# Patient Record
Sex: FEMALE | Race: BLACK OR AFRICAN AMERICAN | NOT HISPANIC OR LATINO
[De-identification: names, ages, dates, MRNs, and addresses within clinical notes are randomized per-mention and may not be internally consistent; named-entity substitution may affect disease eponyms.]

---

## 2018-09-20 PROBLEM — Z00.00 ENCOUNTER FOR PREVENTIVE HEALTH EXAMINATION: Status: ACTIVE | Noted: 2018-09-20

## 2018-10-05 ENCOUNTER — RX RENEWAL (OUTPATIENT)
Age: 53
End: 2018-10-05

## 2018-10-08 ENCOUNTER — APPOINTMENT (OUTPATIENT)
Dept: OBGYN | Facility: CLINIC | Age: 53
End: 2018-10-08
Payer: COMMERCIAL

## 2018-10-08 VITALS
WEIGHT: 197 LBS | BODY MASS INDEX: 29.86 KG/M2 | DIASTOLIC BLOOD PRESSURE: 52 MMHG | HEIGHT: 68 IN | SYSTOLIC BLOOD PRESSURE: 104 MMHG

## 2018-10-08 DIAGNOSIS — Z86.018 PERSONAL HISTORY OF OTHER BENIGN NEOPLASM: ICD-10-CM

## 2018-10-08 DIAGNOSIS — Z80.3 FAMILY HISTORY OF MALIGNANT NEOPLASM OF BREAST: ICD-10-CM

## 2018-10-08 PROCEDURE — 99213 OFFICE O/P EST LOW 20 MIN: CPT

## 2019-05-06 ENCOUNTER — APPOINTMENT (OUTPATIENT)
Dept: OBGYN | Facility: CLINIC | Age: 54
End: 2019-05-06
Payer: COMMERCIAL

## 2019-05-06 VITALS
BODY MASS INDEX: 29.55 KG/M2 | SYSTOLIC BLOOD PRESSURE: 104 MMHG | DIASTOLIC BLOOD PRESSURE: 64 MMHG | WEIGHT: 195 LBS | HEIGHT: 68 IN

## 2019-05-06 PROCEDURE — 99213 OFFICE O/P EST LOW 20 MIN: CPT

## 2019-05-06 NOTE — COUNSELING
[Breast Self Exam] : breast self exam [Nutrition] : nutrition [Exercise] : exercise [Vitamins/Supplements] : vitamins/supplements [STD (testing, results, tx)] : STD (testing, results, tx) [Contraception] : contraception [Medication Management] : medication management

## 2019-12-10 ENCOUNTER — APPOINTMENT (OUTPATIENT)
Dept: OBGYN | Facility: CLINIC | Age: 54
End: 2019-12-10
Payer: COMMERCIAL

## 2019-12-10 ENCOUNTER — RECORD ABSTRACTING (OUTPATIENT)
Age: 54
End: 2019-12-10

## 2019-12-10 VITALS
SYSTOLIC BLOOD PRESSURE: 110 MMHG | DIASTOLIC BLOOD PRESSURE: 68 MMHG | HEIGHT: 68 IN | BODY MASS INDEX: 29.4 KG/M2 | WEIGHT: 194 LBS

## 2019-12-10 DIAGNOSIS — Z78.0 ASYMPTOMATIC MENOPAUSAL STATE: ICD-10-CM

## 2019-12-10 DIAGNOSIS — Z01.419 ENCOUNTER FOR GYNECOLOGICAL EXAMINATION (GENERAL) (ROUTINE) W/OUT ABNORMAL FINDINGS: ICD-10-CM

## 2019-12-10 DIAGNOSIS — Z78.9 OTHER SPECIFIED HEALTH STATUS: ICD-10-CM

## 2019-12-10 LAB
CYTOLOGY CVX/VAG DOC THIN PREP: NORMAL
CYTOLOGY CVX/VAG DOC THIN PREP: NORMAL

## 2019-12-10 PROCEDURE — 99396 PREV VISIT EST AGE 40-64: CPT

## 2019-12-12 NOTE — PHYSICAL EXAM
[Alert] : alert [Awake] : awake [Acute Distress] : no acute distress [Mass] : no breast mass [Nipple Discharge] : no nipple discharge [Axillary LAD] : no axillary lymphadenopathy [Soft] : soft [Tender] : non tender [Oriented x3] : oriented to person, place, and time [Normal] : uterus [No Bleeding] : there was no active vaginal bleeding [Uterine Adnexae] : were not tender and not enlarged

## 2019-12-12 NOTE — REVIEW OF SYSTEMS
[Vaginal Itching] : no vaginal itching [Vaginal Discharge] : vaginal discharge [Vaginal Odor] : no vaginal odor [Hot Flashes] : hot flashes [Nl] : Endocrine

## 2020-02-25 ENCOUNTER — APPOINTMENT (OUTPATIENT)
Dept: OBGYN | Facility: CLINIC | Age: 55
End: 2020-02-25
Payer: COMMERCIAL

## 2020-02-25 VITALS
WEIGHT: 191 LBS | SYSTOLIC BLOOD PRESSURE: 120 MMHG | HEIGHT: 68 IN | BODY MASS INDEX: 28.95 KG/M2 | DIASTOLIC BLOOD PRESSURE: 64 MMHG

## 2020-02-25 PROCEDURE — 99213 OFFICE O/P EST LOW 20 MIN: CPT

## 2020-03-23 ENCOUNTER — APPOINTMENT (OUTPATIENT)
Dept: OBGYN | Facility: CLINIC | Age: 55
End: 2020-03-23
Payer: COMMERCIAL

## 2020-03-23 VITALS
DIASTOLIC BLOOD PRESSURE: 60 MMHG | HEIGHT: 68 IN | SYSTOLIC BLOOD PRESSURE: 102 MMHG | WEIGHT: 191 LBS | BODY MASS INDEX: 28.95 KG/M2

## 2020-03-23 PROCEDURE — 99213 OFFICE O/P EST LOW 20 MIN: CPT

## 2020-08-24 ENCOUNTER — APPOINTMENT (OUTPATIENT)
Dept: OBGYN | Facility: CLINIC | Age: 55
End: 2020-08-24
Payer: COMMERCIAL

## 2020-08-24 VITALS
DIASTOLIC BLOOD PRESSURE: 76 MMHG | HEIGHT: 68 IN | WEIGHT: 183 LBS | BODY MASS INDEX: 27.74 KG/M2 | SYSTOLIC BLOOD PRESSURE: 112 MMHG

## 2020-08-24 PROCEDURE — 99213 OFFICE O/P EST LOW 20 MIN: CPT

## 2020-09-23 ENCOUNTER — APPOINTMENT (OUTPATIENT)
Dept: OBGYN | Facility: CLINIC | Age: 55
End: 2020-09-23
Payer: COMMERCIAL

## 2020-09-23 VITALS
DIASTOLIC BLOOD PRESSURE: 72 MMHG | HEIGHT: 68 IN | WEIGHT: 185 LBS | BODY MASS INDEX: 28.04 KG/M2 | SYSTOLIC BLOOD PRESSURE: 104 MMHG

## 2020-09-23 PROCEDURE — 99213 OFFICE O/P EST LOW 20 MIN: CPT

## 2020-09-23 NOTE — HISTORY OF PRESENT ILLNESS
[FreeTextEntry1] : 53yo  LMP 2018 presenting for definitive management of fibroid uterus.  Pt describes symtoms of pelvic pressure/pain.

## 2020-09-23 NOTE — PHYSICAL EXAM
[Appropriately responsive] : appropriately responsive [Alert] : alert [No Acute Distress] : no acute distress [No Lymphadenopathy] : no lymphadenopathy [Soft] : soft [Non-tender] : non-tender [Non-distended] : non-distended [No HSM] : No HSM [No Lesions] : no lesions [No Mass] : no mass [Oriented x3] : oriented x3 [Labia Majora] : normal [Labia Minora] : normal [Normal] : normal [Enlarged ___ wks] : enlarged [unfilled] ~Uweeks [Uterine Adnexae] : normal

## 2020-09-23 NOTE — COUNSELING
[Nutrition/ Exercise/ Weight Management] : nutrition, exercise, weight management [Vitamins/Supplements] : vitamins/supplements [Pre/Post Op Instructions] : pre/post op instructions

## 2020-09-23 NOTE — DISCUSSION/SUMMARY
[FreeTextEntry1] : I discussed the imaging findings and her symptoms which warrant surgical intervention.  I explained that her symptoms are likely related to fibroids or other intrauterine pathology.  We discussed management options for this which include medical vs surgical management.  As far as medical management, we discussed hormonal vs non-hormonal options including the oral contraceptive pill, LNG IUD, Nuva Ring and TXA.  For surgical management we discussed Hysteroscopy with possible endometrial ablation.  We discussed myomectomy versus hysterectomy.  We also discussed type of hysterectomy, total versus supracervical.  We discussed that there is no proven medical benefit to keeping her cervix.  The patient desires definitive management.\par \par I recommend total hysterectomy with bilateral salpingectomy via an abdominal approach.  The patient agrees to CONSUELO, b/l salpingectomy. We discussed retaining her ovaries to avoid surgical menopause. We discussed the indications, risks, benefits and alternatives.  We discussed bleeding, infection, and injury to nearby organs at length.  All questions answered to her apparent satisfaction and pre/post operative instructions and expectations discussed.  \par \par Will book for CONSUELO, b/l salpingectomy.\par

## 2020-10-16 ENCOUNTER — OUTPATIENT (OUTPATIENT)
Dept: OUTPATIENT SERVICES | Facility: HOSPITAL | Age: 55
LOS: 1 days | End: 2020-10-16
Payer: COMMERCIAL

## 2020-10-16 ENCOUNTER — EMERGENCY (EMERGENCY)
Facility: HOSPITAL | Age: 55
LOS: 1 days | Discharge: HOME | End: 2020-10-16
Attending: EMERGENCY MEDICINE | Admitting: EMERGENCY MEDICINE
Payer: COMMERCIAL

## 2020-10-16 VITALS
TEMPERATURE: 97 F | SYSTOLIC BLOOD PRESSURE: 110 MMHG | RESPIRATION RATE: 18 BRPM | DIASTOLIC BLOOD PRESSURE: 75 MMHG | HEART RATE: 68 BPM | OXYGEN SATURATION: 96 % | HEIGHT: 68 IN

## 2020-10-16 VITALS
HEART RATE: 70 BPM | SYSTOLIC BLOOD PRESSURE: 115 MMHG | TEMPERATURE: 98 F | RESPIRATION RATE: 15 BRPM | DIASTOLIC BLOOD PRESSURE: 69 MMHG | OXYGEN SATURATION: 100 %

## 2020-10-16 VITALS
TEMPERATURE: 97 F | WEIGHT: 182.98 LBS | RESPIRATION RATE: 16 BRPM | HEART RATE: 112 BPM | SYSTOLIC BLOOD PRESSURE: 139 MMHG | OXYGEN SATURATION: 98 % | HEIGHT: 68 IN | DIASTOLIC BLOOD PRESSURE: 76 MMHG

## 2020-10-16 DIAGNOSIS — Z98.890 OTHER SPECIFIED POSTPROCEDURAL STATES: Chronic | ICD-10-CM

## 2020-10-16 DIAGNOSIS — D25.9 LEIOMYOMA OF UTERUS, UNSPECIFIED: ICD-10-CM

## 2020-10-16 DIAGNOSIS — R94.31 ABNORMAL ELECTROCARDIOGRAM [ECG] [EKG]: ICD-10-CM

## 2020-10-16 LAB
ANION GAP SERPL CALC-SCNC: 14 MMO/L — SIGNIFICANT CHANGE UP (ref 7–14)
BLD GP AB SCN SERPL QL: NEGATIVE — SIGNIFICANT CHANGE UP
BUN SERPL-MCNC: 14 MG/DL — SIGNIFICANT CHANGE UP (ref 7–23)
CALCIUM SERPL-MCNC: 9.7 MG/DL — SIGNIFICANT CHANGE UP (ref 8.4–10.5)
CHLORIDE SERPL-SCNC: 102 MMOL/L — SIGNIFICANT CHANGE UP (ref 98–107)
CO2 SERPL-SCNC: 22 MMOL/L — SIGNIFICANT CHANGE UP (ref 22–31)
CREAT SERPL-MCNC: 0.99 MG/DL — SIGNIFICANT CHANGE UP (ref 0.5–1.3)
GLUCOSE SERPL-MCNC: 76 MG/DL — SIGNIFICANT CHANGE UP (ref 70–99)
HCT VFR BLD CALC: 41.3 % — SIGNIFICANT CHANGE UP (ref 34.5–45)
HGB BLD-MCNC: 13.6 G/DL — SIGNIFICANT CHANGE UP (ref 11.5–15.5)
MCHC RBC-ENTMCNC: 28.3 PG — SIGNIFICANT CHANGE UP (ref 27–34)
MCHC RBC-ENTMCNC: 32.9 % — SIGNIFICANT CHANGE UP (ref 32–36)
MCV RBC AUTO: 86 FL — SIGNIFICANT CHANGE UP (ref 80–100)
NRBC # FLD: 0 K/UL — SIGNIFICANT CHANGE UP (ref 0–0)
PLATELET # BLD AUTO: 329 K/UL — SIGNIFICANT CHANGE UP (ref 150–400)
PMV BLD: 8.7 FL — SIGNIFICANT CHANGE UP (ref 7–13)
POTASSIUM SERPL-MCNC: 4.5 MMOL/L — SIGNIFICANT CHANGE UP (ref 3.5–5.3)
POTASSIUM SERPL-SCNC: 4.5 MMOL/L — SIGNIFICANT CHANGE UP (ref 3.5–5.3)
RBC # BLD: 4.8 M/UL — SIGNIFICANT CHANGE UP (ref 3.8–5.2)
RBC # FLD: 14.1 % — SIGNIFICANT CHANGE UP (ref 10.3–14.5)
RH IG SCN BLD-IMP: POSITIVE — SIGNIFICANT CHANGE UP
SODIUM SERPL-SCNC: 138 MMOL/L — SIGNIFICANT CHANGE UP (ref 135–145)
WBC # BLD: 7.33 K/UL — SIGNIFICANT CHANGE UP (ref 3.8–10.5)
WBC # FLD AUTO: 7.33 K/UL — SIGNIFICANT CHANGE UP (ref 3.8–10.5)

## 2020-10-16 PROCEDURE — 93010 ELECTROCARDIOGRAM REPORT: CPT

## 2020-10-16 PROCEDURE — 99283 EMERGENCY DEPT VISIT LOW MDM: CPT

## 2020-10-16 RX ORDER — SODIUM CHLORIDE 9 MG/ML
1000 INJECTION, SOLUTION INTRAVENOUS
Refills: 0 | Status: DISCONTINUED | OUTPATIENT
Start: 2020-10-23 | End: 2020-10-24

## 2020-10-16 NOTE — ED PROVIDER NOTE - NS ED ROS FT
Gen: Denies fever  HEENT: Denies headache and congestion.  CV: Denies chest pain, lightheadedness, and LE swelling.  Skin: Denies rash.   Resp: Denies SOB and cough.  GI: Denies abd pain, diarrhea, nausea, and vomiting. Denies melena and hematochezia.  Msk: Denies recent trauma and extremity pain.  : Denies dysuria and hematuria.  Neuro: Denies LOC, dizziness, and new numbness/tingling or new focal weakness  Psych: Denies SI

## 2020-10-16 NOTE — ED ADULT TRIAGE NOTE - CHIEF COMPLAINT QUOTE
Pt comes to ED from ambulatory surgery showing PAT on EKG, HR to 120. Pt denied feeling dyspnea, chest pain, palpitations, dizziness, diaphoresis and all other symptoms. Pt is asymptomatic at this time. Denies PMH, currently takes birth control

## 2020-10-16 NOTE — ED PROVIDER NOTE - PATIENT PORTAL LINK FT
You can access the FollowMyHealth Patient Portal offered by [facility] by registering at the following website: [email or web address]. By joining [facility]’s FollowMyHealth portal, you will also be able to view your health information using other applications (apps) compatible with our system.

## 2020-10-16 NOTE — ED PROVIDER NOTE - PHYSICAL EXAMINATION
Gen: A&Ox4. Non-toxic appearing  HEENT: Atraumatic. No pharyngeal erythema of exudates. Mucous membranes moist, no scleral icterus.   CV: Regular rate and rhythm. No M/R/G. No significant lower extremity edema. Radial and DP pulses present 2+ bilaterally. Capillary refill less than 2 seconds.  Resp: Normal effort. CTAB, no rales, rhonchi, or wheezes.  GI: Abdomen non tender to palpation, soft and non-distended. No masses appreciated. + bowel sounds.  MSK: No open wounds or no ecchymosis  Neuro: Speaking in full sentences, following commands, moving extremities spontaneously  Psych: Appropriate mood, cooperative

## 2020-10-16 NOTE — H&P PST ADULT - RS GEN PE MLT RESP DETAILS PC
good air movement/clear to auscultation bilaterally/no rhonchi/no wheezes/breath sounds equal/no rales/airway patent

## 2020-10-16 NOTE — ED PROVIDER NOTE - NSPTACCESSSVCSAPPTDETAILS_ED_ALL_ED_FT
URGENT: Needs follow up with cardiology urgently for evaluation of arrhythmia caught on outpatient ecg.

## 2020-10-16 NOTE — ED ADULT NURSE NOTE - OBJECTIVE STATEMENT
pt brought into room 21 A&OX4 ambulatory self care 57 yr old female presents to ed today for abnormal EKG from ambulatory surgery. EKG showed PAT. PT denies any complaints, denies CP/SOB/ palpitations. pt in NAD. Awaiting further orders. Report given to primary RN Leonardo

## 2020-10-16 NOTE — ED PROVIDER NOTE - CLINICAL SUMMARY MEDICAL DECISION MAKING FREE TEXT BOX
54 yo F w/ hx of fibroids, no reported pertinent PMH, presenting from ambulatory surgery - pre-op visit for hysterectomy for episode of SVT/PAT caught on ECG today. No symptoms associated. No hx of cardiac disease. Narrow complex arrhythmia caught on ecg, terminated spontaneously on same ecg. VSS, exam as above. Concern for arrhythmia. Labs, will reassess.

## 2020-10-16 NOTE — ED PROVIDER NOTE - OBJECTIVE STATEMENT
54 yo F w/ no reported pertinent PMH, presenting from ambulatory surgery for episode of SVT/PAT caught on ECG today.   She denies having any symptoms with this. Denies dyspnea, palpitations, dizziness, diaphoresis, CP. On BC, no other medications. 56 yo F w/ hx of fibroids, no reported pertinent PMH, presenting from ambulatory surgery - pre-op visit for hysterectomy for episode of SVT/PAT caught on ECG today. She denies having any symptoms with this. Denies dyspnea, palpitations, dizziness, diaphoresis, CP. On BC, no other medications. Currently taking levonorgestrel, no other medications. Denies allergies to medications. No hx of similar episodes according to pt. No reported hx of cardiac disease. 56 yo F w/ hx of fibroids, no reported pertinent PMH, presenting from ambulatory surgery - pre-op visit for hysterectomy for episode of SVT/PAT caught on ECG today. She denies having any symptoms with this. Denies dyspnea, palpitations, dizziness, diaphoresis, CP. On BC, no other medications. Currently taking levonorgestrel, no other medications. Denies allergies to medications. No hx of similar episodes according to pt. No reported hx of cardiac disease.    Attendinyo female presents because EKG earlier showed tachydysrhythmia.  pt feels fine now.  no complaints.

## 2020-10-16 NOTE — H&P PST ADULT - NEGATIVE GENERAL GENITOURINARY SYMPTOMS
no flank pain L/no bladder infections/no dysuria/no hematuria/normal urinary frequency/no flank pain R

## 2020-10-16 NOTE — H&P PST ADULT - NSICDXPROBLEM_GEN_ALL_CORE_FT
PROBLEM DIAGNOSES  Problem: Leiomyoma of uterus  Assessment and Plan: Patient tentatively scheduled for total abdominal hysterectomy , bilateral salpingectomy on 10/23/20  Pre-op instructions provided. Pt given verbal and written instructions with teach back on chlorhexidine shampoo and pepcid. Pt verbalized understanding with return demonstration.   Covid test scheduled on 10/20/20    Problem: Abnormal EKG  Assessment and Plan: Patient heart rate on exam around 115- 120. EKG done at [facility] revealed abnormal EKG. Case discussed with Dr Esquivel. Discussed with jesika the need to be transfer to ED for further evaluation. EMS called and patient transfered to [facility] ED. Will notify surgeon via phone call        PROBLEM DIAGNOSES  Problem: Leiomyoma of uterus  Assessment and Plan: Patient tentatively scheduled for total abdominal hysterectomy , bilateral salpingectomy on 10/23/20  Pre-op instructions provided. Pt given verbal and written instructions with teach back on chlorhexidine shampoo and pepcid. Pt verbalized understanding with return demonstration.   Covid test scheduled on 10/20/20    Problem: Abnormal EKG  Assessment and Plan: Patient heart rate on exam around 115- 120. EKG done at [facility] revealed abnormal EKG. Patient asymptomatic Case discussed with Dr Esquivel. Discussed with jesika the need to be transfer to ED for further evaluation. EMS called and patient transfered to [facility] ED at 1915 hrs . Surgeon to be notifed via phone call as email unavailable.

## 2020-10-16 NOTE — H&P PST ADULT - NEGATIVE OPHTHALMOLOGIC SYMPTOMS
no discharge R/no blurred vision R/no lacrimation L/no lacrimation R/no diplopia/no discharge L/no blurred vision L

## 2020-10-16 NOTE — ED PROVIDER NOTE - NSFOLLOWUPCLINICS_GEN_ALL_ED_FT
[facility]  Cardiology  [address] [place] [id number]  Phone: [phone]  Fax:   Follow Up Time:

## 2020-10-16 NOTE — H&P PST ADULT - NSANTHOSAYNRD_GEN_A_CORE
No. OLLIE screening performed.  STOP BANG Legend: 0-2 = LOW Risk; 3-4 = INTERMEDIATE Risk; 5-8 = HIGH Risk

## 2020-10-16 NOTE — H&P PST ADULT - HISTORY OF PRESENT ILLNESS
leiomyoma of uterus scheduled for total abdominal hysterectomy , bilateral salpingectomy. 55 year old female presents to Presurgical testing with diagnosis of leiomyoma of uterus scheduled for total abdominal hysterectomy , bilateral salpingectomy. Patient with c/o pelvic pressure and pain  since one year. RALPH revealed uterine fibroids.

## 2020-10-16 NOTE — H&P PST ADULT - NSICDXPASTSURGICALHX_GEN_ALL_CORE_FT
PAST SURGICAL HISTORY:  S/P removal of ovarian cyst      PAST SURGICAL HISTORY:  S/P bunionectomy b/l  foot 2007/2009    S/P removal of ovarian cyst 2008

## 2020-10-16 NOTE — ED PROVIDER NOTE - NSFOLLOWUPINSTRUCTIONS_ED_ALL_ED_FT
You presented to the emergency department for an abnormal heart rhythm. Your evaluation in the emergency department included an physician evaluation and testing consisting of: Lab work and imaging. These tests did not reveal any findings concerning for immediate threats to your health. Any results are available to you in your discharge paperwork. It is recommended that you follow-up with your physician or the recommended physician/clinic (cardiology) within 1-3 days as discussed for a repeat evaluation, and potentially further testing and treatment.     Please continue taking your regular medications as prescribed.     PLEASE RETURN TO THE EMERGENCY DEPARTMENT IMMEDIATELY IF you have any fevers, uncontrollable nausea and vomiting, LIGHTHEADEDNESS, inability to tolerate eating and drinking, severe increase in symptoms or pain, or an other new symptoms that concern you.

## 2020-10-16 NOTE — H&P PST ADULT - NEGATIVE ENMT SYMPTOMS
no nasal congestion/no hearing difficulty/no vertigo/no sinus symptoms/no ear pain/no nasal discharge

## 2020-10-19 ENCOUNTER — TRANSCRIPTION ENCOUNTER (OUTPATIENT)
Age: 55
End: 2020-10-19

## 2020-10-19 PROBLEM — D25.9 LEIOMYOMA OF UTERUS, UNSPECIFIED: Chronic | Status: ACTIVE | Noted: 2020-10-16

## 2020-10-20 ENCOUNTER — APPOINTMENT (OUTPATIENT)
Dept: DISASTER EMERGENCY | Facility: CLINIC | Age: 55
End: 2020-10-20

## 2020-10-20 ENCOUNTER — APPOINTMENT (OUTPATIENT)
Dept: CARDIOLOGY | Facility: CLINIC | Age: 55
End: 2020-10-20
Payer: COMMERCIAL

## 2020-10-20 VITALS
WEIGHT: 185 LBS | OXYGEN SATURATION: 98 % | HEART RATE: 71 BPM | DIASTOLIC BLOOD PRESSURE: 72 MMHG | BODY MASS INDEX: 28.04 KG/M2 | TEMPERATURE: 99.5 F | HEIGHT: 68 IN | RESPIRATION RATE: 17 BRPM | SYSTOLIC BLOOD PRESSURE: 113 MMHG

## 2020-10-20 PROCEDURE — 93306 TTE W/DOPPLER COMPLETE: CPT

## 2020-10-20 PROCEDURE — 99203 OFFICE O/P NEW LOW 30 MIN: CPT

## 2020-10-20 PROCEDURE — 99072 ADDL SUPL MATRL&STAF TM PHE: CPT

## 2020-10-21 ENCOUNTER — NON-APPOINTMENT (OUTPATIENT)
Age: 55
End: 2020-10-21

## 2020-10-21 LAB — SARS-COV-2 N GENE NPH QL NAA+PROBE: NOT DETECTED

## 2020-10-21 NOTE — PHYSICAL EXAM
[General Appearance - In No Acute Distress] : no acute distress [Normal Appearance] : normal appearance [Eyelids - No Xanthelasma] : the eyelids demonstrated no xanthelasmas [Normal Jugular Venous A Waves Present] : normal jugular venous A waves present [Normal Jugular Venous V Waves Present] : normal jugular venous V waves present [No Jugular Venous Blanchard A Waves] : no jugular venous blanchard A waves [Heart Rate And Rhythm] : heart rate and rhythm were normal [Murmurs] : no murmurs present [Heart Sounds] : normal S1 and S2 [Arterial Pulses Normal] : the arterial pulses were normal [Edema] : no peripheral edema present [Respiration, Rhythm And Depth] : normal respiratory rhythm and effort [Auscultation Breath Sounds / Voice Sounds] : lungs were clear to auscultation bilaterally [Abdomen Soft] : soft [Abdomen Tenderness] : non-tender [Abnormal Walk] : normal gait [Nail Clubbing] : no clubbing of the fingernails [Cyanosis, Localized] : no localized cyanosis [Skin Color & Pigmentation] : normal skin color and pigmentation [Skin Turgor] : normal skin turgor [] : no rash [Oriented To Time, Place, And Person] : oriented to person, place, and time [Impaired Insight] : insight and judgment were intact [FreeTextEntry1] : LAUREL monterol

## 2020-10-21 NOTE — REVIEW OF SYSTEMS
[Abdominal Pain] : abdominal pain [see HPI] : see HPI [Negative] : ENT [Fever] : no fever [Chills] : no chills [Blurry Vision] : no blurred vision [Eyeglasses] : not currently wearing eyeglasses [Dysphagia] : no dysphagia [Dysuria] : no dysuria [Incontinence] : no incontinence [Joint Pain] : no joint pain [Muscle Cramps] : no muscle cramps [Skin: A Rash] : no rash: [Dizziness] : no dizziness [Convulsions] : no convulsions [Confusion] : no confusion was observed [Anxiety] : no anxiety [Excessive Thirst] : no polydipsia [Easy Bleeding] : no tendency for easy bleeding [Easy Bruising] : no tendency for easy bruising

## 2020-10-21 NOTE — DISCUSSION/SUMMARY
[FreeTextEntry1] : Abnormal ECG/pAT. Asymptomatic but noted on two outpt ECG's.\par -TTE today without evidence of structural heart disease\par -24 hour holter monitor with 4% SVE burden, runs of SVT which upon further review may reflect PAT vs. PJT. No further treatment unless the pt is to become symptomatic.\par -we reviewed limiting caffeine intake to her one cup of coffee daily and to avoid the use of pre-workout supplements as they usually contain caffeine as well as other sympathomimetics that can precipitate cardiac tachyarrhythmias.\par \par There is no cardiac contraindication to proceeding with the planned procedure. paroxysms of SVT may be expected and may be conservatively managed unless there is hemodynamic compromise or symptoms.\par

## 2020-10-21 NOTE — HISTORY OF PRESENT ILLNESS
[FreeTextEntry1] : 55F with fibroid uterus who presents for paroxysmal SVT noted during routine preop testing.\par \par CONSUELO/ b/l salpingectomy planned for 10/23/2020\par \par ECG from 10/16/2020 reviewed\par SR, possible LAE\par poor scan resolution, however appears to be pAT\par \par pt reports no symptoms. no CP, SOB, URIBE, orthopnea, LE edema, dizziness, or LOC\par  \par maternal gfather MI age 57\par \par Previously took a supplement named "Uncut." contains an unlisted amount of caffeine, some vitamins, and what appears to be sympathomimetic herbs. \par 1 cup of coffee daily\par no tobacco, rare etoh, no other supplements.

## 2020-10-22 ENCOUNTER — TRANSCRIPTION ENCOUNTER (OUTPATIENT)
Age: 55
End: 2020-10-22

## 2020-10-23 ENCOUNTER — TRANSCRIPTION ENCOUNTER (OUTPATIENT)
Age: 55
End: 2020-10-23

## 2020-10-23 ENCOUNTER — RESULT REVIEW (OUTPATIENT)
Age: 55
End: 2020-10-23

## 2020-10-23 ENCOUNTER — APPOINTMENT (OUTPATIENT)
Dept: OBGYN | Facility: CLINIC | Age: 55
End: 2020-10-23

## 2020-10-23 ENCOUNTER — INPATIENT (INPATIENT)
Facility: HOSPITAL | Age: 55
LOS: 2 days | Discharge: HOME | End: 2020-10-26
Attending: OBSTETRICS & GYNECOLOGY | Admitting: OBSTETRICS & GYNECOLOGY
Payer: COMMERCIAL

## 2020-10-23 VITALS
RESPIRATION RATE: 16 BRPM | DIASTOLIC BLOOD PRESSURE: 68 MMHG | SYSTOLIC BLOOD PRESSURE: 112 MMHG | TEMPERATURE: 99 F | HEIGHT: 68 IN | HEART RATE: 75 BPM | WEIGHT: 182.98 LBS | OXYGEN SATURATION: 100 %

## 2020-10-23 DIAGNOSIS — D25.9 LEIOMYOMA OF UTERUS, UNSPECIFIED: ICD-10-CM

## 2020-10-23 DIAGNOSIS — Z98.890 OTHER SPECIFIED POSTPROCEDURAL STATES: Chronic | ICD-10-CM

## 2020-10-23 LAB
CK MB BLD-MCNC: 1.35 NG/ML — SIGNIFICANT CHANGE UP (ref 1–4.7)
CK SERPL-CCNC: 145 U/L — SIGNIFICANT CHANGE UP (ref 25–170)
RH IG SCN BLD-IMP: POSITIVE — SIGNIFICANT CHANGE UP
TROPONIN T, HIGH SENSITIVITY: < 6 NG/L — SIGNIFICANT CHANGE UP (ref ?–14)

## 2020-10-23 PROCEDURE — 88305 TISSUE EXAM BY PATHOLOGIST: CPT | Mod: 26

## 2020-10-23 PROCEDURE — 58150 TOTAL HYSTERECTOMY: CPT

## 2020-10-23 PROCEDURE — 88307 TISSUE EXAM BY PATHOLOGIST: CPT | Mod: 26

## 2020-10-23 PROCEDURE — 93010 ELECTROCARDIOGRAM REPORT: CPT

## 2020-10-23 RX ORDER — OXYCODONE AND ACETAMINOPHEN 5; 325 MG/1; MG/1
1 TABLET ORAL EVERY 4 HOURS
Refills: 0 | Status: DISCONTINUED | OUTPATIENT
Start: 2020-10-23 | End: 2020-10-23

## 2020-10-23 RX ORDER — ACETAMINOPHEN 500 MG
975 TABLET ORAL EVERY 6 HOURS
Refills: 0 | Status: DISCONTINUED | OUTPATIENT
Start: 2020-10-23 | End: 2020-10-26

## 2020-10-23 RX ORDER — ONDANSETRON 8 MG/1
4 TABLET, FILM COATED ORAL EVERY 4 HOURS
Refills: 0 | Status: DISCONTINUED | OUTPATIENT
Start: 2020-10-23 | End: 2020-10-26

## 2020-10-23 RX ORDER — METOPROLOL TARTRATE 50 MG
5 TABLET ORAL ONCE
Refills: 0 | Status: DISCONTINUED | OUTPATIENT
Start: 2020-10-23 | End: 2020-10-23

## 2020-10-23 RX ORDER — OXYCODONE HYDROCHLORIDE 5 MG/1
5 TABLET ORAL EVERY 4 HOURS
Refills: 0 | Status: DISCONTINUED | OUTPATIENT
Start: 2020-10-23 | End: 2020-10-26

## 2020-10-23 RX ORDER — SODIUM CHLORIDE 9 MG/ML
500 INJECTION, SOLUTION INTRAVENOUS ONCE
Refills: 0 | Status: COMPLETED | OUTPATIENT
Start: 2020-10-23 | End: 2020-10-23

## 2020-10-23 RX ORDER — METOPROLOL TARTRATE 50 MG
25 TABLET ORAL ONCE
Refills: 0 | Status: COMPLETED | OUTPATIENT
Start: 2020-10-23 | End: 2020-10-23

## 2020-10-23 RX ORDER — METOPROLOL TARTRATE 50 MG
5 TABLET ORAL ONCE
Refills: 0 | Status: COMPLETED | OUTPATIENT
Start: 2020-10-23 | End: 2020-10-23

## 2020-10-23 RX ORDER — KETOROLAC TROMETHAMINE 30 MG/ML
15 SYRINGE (ML) INJECTION EVERY 6 HOURS
Refills: 0 | Status: DISCONTINUED | OUTPATIENT
Start: 2020-10-23 | End: 2020-10-24

## 2020-10-23 RX ORDER — OXYCODONE AND ACETAMINOPHEN 5; 325 MG/1; MG/1
2 TABLET ORAL EVERY 6 HOURS
Refills: 0 | Status: DISCONTINUED | OUTPATIENT
Start: 2020-10-23 | End: 2020-10-23

## 2020-10-23 RX ORDER — HYDROMORPHONE HYDROCHLORIDE 2 MG/ML
0.25 INJECTION INTRAMUSCULAR; INTRAVENOUS; SUBCUTANEOUS ONCE
Refills: 0 | Status: DISCONTINUED | OUTPATIENT
Start: 2020-10-23 | End: 2020-10-23

## 2020-10-23 RX ORDER — SODIUM CHLORIDE 9 MG/ML
1000 INJECTION, SOLUTION INTRAVENOUS
Refills: 0 | Status: DISCONTINUED | OUTPATIENT
Start: 2020-10-23 | End: 2020-10-24

## 2020-10-23 RX ORDER — INFLUENZA VIRUS VACCINE 15; 15; 15; 15 UG/.5ML; UG/.5ML; UG/.5ML; UG/.5ML
0.5 SUSPENSION INTRAMUSCULAR ONCE
Refills: 0 | Status: DISCONTINUED | OUTPATIENT
Start: 2020-10-23 | End: 2020-10-26

## 2020-10-23 RX ADMIN — HYDROMORPHONE HYDROCHLORIDE 0.25 MILLIGRAM(S): 2 INJECTION INTRAMUSCULAR; INTRAVENOUS; SUBCUTANEOUS at 19:00

## 2020-10-23 RX ADMIN — Medication 15 MILLIGRAM(S): at 23:01

## 2020-10-23 RX ADMIN — Medication 25 MILLIGRAM(S): at 18:45

## 2020-10-23 RX ADMIN — Medication 5 MILLIGRAM(S): at 19:45

## 2020-10-23 RX ADMIN — SODIUM CHLORIDE 500 MILLILITER(S): 9 INJECTION, SOLUTION INTRAVENOUS at 17:15

## 2020-10-23 RX ADMIN — HYDROMORPHONE HYDROCHLORIDE 0.25 MILLIGRAM(S): 2 INJECTION INTRAMUSCULAR; INTRAVENOUS; SUBCUTANEOUS at 19:15

## 2020-10-23 RX ADMIN — Medication 975 MILLIGRAM(S): at 23:01

## 2020-10-23 NOTE — BRIEF OPERATIVE NOTE - OPERATION/FINDINGS
Multifibroid uterus, bilateral ovaries significantly attenuated. MARCUS from bowel to left adnexa, taken down without complication. Sixto and fibrillar placed for hemostasis.

## 2020-10-23 NOTE — BRIEF OPERATIVE NOTE - NSICDXBRIEFPROCEDURE_GEN_ALL_CORE_FT
PROCEDURES:  Lysis of adhesions, pelvic 23-Oct-2020 19:58:36  Paulie Pascual  Total abdominal hysterectomy with salpingectomy 23-Oct-2020 19:56:10  Paulie Pascual

## 2020-10-23 NOTE — ASU PREOP CHECKLIST - ADVANCE DIRECTIVE ADDRESSED/READDRESSED
2019 [facility] Clinical Data Registry (for Quality Improvement)     Postoperative nausea/vomiting risk protocol (Adult = 18 yrs and Pediatric 3-17 yrs)- (430 and 463)  General inhalation anesthetic (NOT TIVA) with PONV risk factors: No  Provision of anti-emetic therapy with at least 2 different classes of agents: N/A  Patient DID NOT receive anti-emetic therapy and reason is documented in Medical Record: N/A    Multimodal Pain Management- (477)  Non-emergent surgery AND patient age >= 18: Yes  Use of Multimodal Pain Management, two or more drugs and/or interventions, NOT including systemic opioids: Yes  Exception: Documented allergy to multiple classes of analgesics: N/A    Smoking Abstinence (404)  Patient is current smoker (cigarette, pipe, e-cig, marijuanna): No  Elective Surgery:   Abstinence instructions provided prior to day of surgery:   Patient abstained from smoking on day of surgery:     Pre-Op Beta-Blocker in Isolated CABG (44)  Isolated CABG AND patient age >= 18: No  Beta-blocker admin within 24 hours of surgical incision:   Exception:of medical reason(s) for not administering beta blocker within 24 hours prior to surgical incision (e.g., not  indicated,other medical reason):     PACU assessment of acute postoperative pain prior to Anesthesia Care End- Applies to Patients Age = 18- (ABG7)  Initial PACU pain score is which of the following: < 7/10  Patient unable to report pain score: N/A    Post-anesthetic transfer of care checklist/protocol to PACU/ICU- (426 and 427)  Upon conclusion of case, patient transferred to which of the following locations: PACU/Non-ICU  Use of transfer checklist/protocol: Yes  Exclusion: Service Performed in Patient Hospital Room (and thus did not require transfer): N/A  Unplanned admission to ICU related to anesthesia service up through end of PACU care- (MD51)  Unplanned admission to ICU (not initially anticipated at anesthesia start time): No         
done

## 2020-10-23 NOTE — CHART NOTE - NSCHARTNOTEFT_GEN_A_CORE
PACU course notable for persistent tachycardia in 120's. Patient denies CP, SOB. N/V  EKG notable for new T wave inversions.  GYN team made made aware, Cardiology consulted.    O: Vital Signs Last 24 Hrs  T(C): 36.6 (23 Oct 2020 17:00), Max: 37.2 (23 Oct 2020 12:23)  T(F): 97.9 (23 Oct 2020 17:00), Max: 98.9 (23 Oct 2020 12:23)  HR: 119 (23 Oct 2020 18:30) (75 - 121)  BP: 135/75 (23 Oct 2020 18:15) (112/68 - 135/75)  BP(mean): 92 (23 Oct 2020 18:15) (79 - 92)  RR: 12 (23 Oct 2020 18:30) (8 - 16)  SpO2: 97% (23 Oct 2020 18:00) (90% - 100%)    A/P:  GYN team made aware  Cardiology   Contacted  Cardiac enzyme pending PACU course notable for persistent tachycardia in 120's. Patient denies CP, SOB. N/V  EKG notable for new T wave inversions.    O:   PHYSICAL EXAM:  GENERAL: NAD, lying in bed comfortably  CHEST/LUNG: Clear to auscultation bilaterally; Unlabored respirations  HEART: S1,S2, tachycardia  ABDOMEN: appropriately tender    Vital Signs Last 24 Hrs  T(C): 36.6 (23 Oct 2020 17:00), Max: 37.2 (23 Oct 2020 12:23)  T(F): 97.9 (23 Oct 2020 17:00), Max: 98.9 (23 Oct 2020 12:23)  HR: 119 (23 Oct 2020 18:30) (75 - 121)  BP: 135/75 (23 Oct 2020 18:15) (112/68 - 135/75)  BP(mean): 92 (23 Oct 2020 18:15) (79 - 92)  RR: 12 (23 Oct 2020 18:30) (8 - 16)  SpO2: 97% (23 Oct 2020 18:00) (90% - 100%)    A/P:    #Tachycardia, TWI   -GYN team made aware  -Cardiology consulted. Primary team will f/u Cardiology recommendations  -Metoprolol 25mg PO  -Cardiac enzymes pending PACU course notable for persistent tachycardia in 120's. Patient denies CP, SOB. N/V  EKG notable for new T wave inversions.    O:   PHYSICAL EXAM:  GENERAL: NAD, lying in bed comfortably  CHEST/LUNG: Clear to auscultation bilaterally; Unlabored respirations  HEART: S1,S2, tachycardia  ABDOMEN: appropriately tender    Vital Signs Last 24 Hrs  T(C): 36.6 (23 Oct 2020 17:00), Max: 37.2 (23 Oct 2020 12:23)  T(F): 97.9 (23 Oct 2020 17:00), Max: 98.9 (23 Oct 2020 12:23)  HR: 119 (23 Oct 2020 18:30) (75 - 121)  BP: 135/75 (23 Oct 2020 18:15) (112/68 - 135/75)  BP(mean): 92 (23 Oct 2020 18:15) (79 - 92)  RR: 12 (23 Oct 2020 18:30) (8 - 16)  SpO2: 97% (23 Oct 2020 18:00) (90% - 100%)    A/P:    #Tachycardia, TWI   -Cardiology consulted. Primary team (GYN) will f/u Cardiology recommendations  -Primary team made aware  -Metoprolol 25mg PO  -Cardiac enzymes pending

## 2020-10-23 NOTE — DISCHARGE NOTE PROVIDER - HOSPITAL COURSE
Patient had uncomplicated CONSUELO+BS, MARCUS, TAP Blocks.  Please see operative note for details.  During postoperative course patient's vitals were stable, vaginal bleeding appropriate, and pain well controlled.  Post operation day one hematocrit was _________.  On day of discharge patient was ambulating, her pain controlled with oral medications, had adequate oral intake, and was voiding freely.  Discharge instructions and precautions were given.  Will have close follow up with Dr. Branch Patient had uncomplicated CONSUELO+BS, MARCUS, TAP Blocks.  Please see operative note for details.  During postoperative course patient's vitals were stable, vaginal bleeding appropriate, and pain well controlled.  Post operation day one hematocrit was 29.7.  On day of discharge patient was ambulating, her pain controlled with oral medications, had adequate oral intake, and was voiding freely.  Discharge instructions and precautions were given.  Will have close follow up with Dr. Branch Patient had uncomplicated CONSUELO+BS, MARCUS, TAP Blocks. Please see operative note for details. . In the PACU, pt was noted to be tachycardic to 120s; EKG completed at that time was notable for inverted T-waves. She was evaluated by Cardiology in house due to her previous history of SVT on EKG from [facility]. Recommendation at that time was for patient to start on Metoprolol tartrate 25mg q8hr. On POD#1, Chang was discontinued and pt voided spontaneously. She was meeting all postoperative milestones. On POD#2, pt was having few episodes of SVT with spontaneous resolution. Pt had not been receiving Metroprolol due to low BPs; Cardiology evaluated the patient and recommended changing hold parameters for the medication and frequency to BID.    POD#0 pt was advanced to a regular diet,   During postoperative course patient's vitals were stable, vaginal bleeding appropriate, and pain well controlled.  POD#1, operation day one hematocrit was 29.7.  On day of discharge patient was ambulating, her pain controlled with oral medications, had adequate oral intake, and was voiding freely.  Discharge instructions and precautions were given.  Will have close follow up with Dr. Branch

## 2020-10-23 NOTE — CHART NOTE - NSCHARTNOTEFT_GEN_A_CORE
R2 GYN POST-OP CHECK    S: Notified by RN that pt with persistent tachycardia to 120s. EKG was completed and reportedly notable for inverted T waves. Pt was without acute complaints (CP, SOB).   On bedside evaluation, pt reiterating no acute complaints. Pt awake and alert resting comfortably in bed. Patient reports pain controlled with analgesia.   Pt denies N/V, SOB, CP, palpitations, fever/chills. Not yet tried PO. Not yet OOB.    O:   T(C): 36.9 (10-23-20 @ 16:15), Max: 36.9 (10-23-20 @ 16:15)  HR: 117 (10-23-20 @ 17:45) (116 - 121)  BP: 128/63 (10-23-20 @ 17:30) (117/65 - 128/71)  RR: 11 (10-23-20 @ 17:45) (8 - 15)  SpO2: 100% (10-23-20 @ 17:45) (90% - 100%)  I&O's Summary    23 Oct 2020 07:01  -  23 Oct 2020 17:51  --------------------------------------------------------  IN: 200 mL / OUT: 130 mL / NET: 70 mL      Gen: Resting comfortably in bed, NAD  CV: S1S2, tachycardic  Lungs: CTAB  Abd: soft, appropriately tender, hypoactive BS; no rebound/guarding.    Inc: Pfannenstiel C/D/I  : +Chang, clear urine  Ext: SCD's in place and functional, non-tender b/l, no edema    MEDICATIONS  (STANDING):  acetaminophen   Tablet .. 975 milliGRAM(s) Oral every 6 hours  ketorolac   Injectable 15 milliGRAM(s) IV Push every 6 hours  lactated ringers. 1000 milliLiter(s) (125 mL/Hr) IV Continuous <Continuous>  lactated ringers. 1000 milliLiter(s) (30 mL/Hr) IV Continuous <Continuous>    MEDICATIONS  (PRN):  ondansetron Injectable 4 milliGRAM(s) IV Push every 4 hours PRN Nausea and/or Vomiting  oxycodone    5 mG/acetaminophen 325 mG 1 Tablet(s) Oral every 4 hours PRN Moderate Pain (4 - 6)  oxycodone    5 mG/acetaminophen 325 mG 2 Tablet(s) Oral every 6 hours PRN Severe Pain (7 - 10)    54yo POD#0 s/p CONSUELO-BS. PACU course notable for tachycardia  []to be seen by cards  []INCOMPLETE NOTE R2 GYN POST-OP CHECK    S: Notified by RN that pt with persistent tachycardia to 120s. EKG was completed and reportedly notable for inverted T waves. Pt was without acute complaints (CP, SOB).   On bedside evaluation, pt reiterating no acute complaints. Pt awake and alert resting comfortably in bed. Patient reports pain controlled with analgesia.   Pt denies N/V, SOB, CP, palpitations, fever/chills. Not yet tried PO. Not yet OOB.    O:   T(C): 36.9 (10-23-20 @ 16:15), Max: 36.9 (10-23-20 @ 16:15)  HR: 117 (10-23-20 @ 17:45) (116 - 121)  BP: 128/63 (10-23-20 @ 17:30) (117/65 - 128/71)  RR: 11 (10-23-20 @ 17:45) (8 - 15)  SpO2: 100% (10-23-20 @ 17:45) (90% - 100%)  I&O's Summary    23 Oct 2020 07:01  -  23 Oct 2020 17:51  --------------------------------------------------------  IN: 200 mL / OUT: 130 mL / NET: 70 mL      Gen: Resting comfortably in bed, NAD  CV: S1S2, tachycardic  Lungs: CTAB  Abd: soft, appropriately tender, hypoactive BS; no rebound/guarding.    Inc: Pfannenstiel C/D/I  : +Chang, clear urine  Ext: SCD's in place and functional, non-tender b/l, no edema    MEDICATIONS  (STANDING):  acetaminophen   Tablet .. 975 milliGRAM(s) Oral every 6 hours  ketorolac   Injectable 15 milliGRAM(s) IV Push every 6 hours  lactated ringers. 1000 milliLiter(s) (125 mL/Hr) IV Continuous <Continuous>  lactated ringers. 1000 milliLiter(s) (30 mL/Hr) IV Continuous <Continuous>    MEDICATIONS  (PRN):  ondansetron Injectable 4 milliGRAM(s) IV Push every 4 hours PRN Nausea and/or Vomiting  oxycodone    5 mG/acetaminophen 325 mG 1 Tablet(s) Oral every 4 hours PRN Moderate Pain (4 - 6)  oxycodone    5 mG/acetaminophen 325 mG 2 Tablet(s) Oral every 6 hours PRN Severe Pain (7 - 10)    54yo POD#0 s/p CONSUELO-BS. PACU course notable for tachycardia and EKG findings with inverse T waves. Pt without acute complaints of CP and benign abdominal findings.  Neuro: Tylenol, Toradol, Oxy PRN  CV: SVT on PST EKG; outpt workup with Cards with TTE wnl; Cards consulted by PACU anesthesia, will f/u recs; AM CBC   Pulm: Saturating well on room air. Encourage OOB and incentive spirometer use.   FEN: LR@125, reg diet  : Chang to gravity.   Heme: DVT ppx w/ SCD's while in bed. Early ambulation, initially with assistance then as tolerated. HSQ.   ID: Afebrile  Dispo: inpt postop care; f/u Cards recs    d/w Dr. Jose Carlos Bonilla R2  #[id number]

## 2020-10-23 NOTE — DISCHARGE NOTE PROVIDER - CARE PROVIDER_API CALL
[facility] - GENERAL OTHER  [address], [address] [place] [address]  Phone: [phone]  Fax: [phone]  Follow Up Time:

## 2020-10-23 NOTE — CONSULT NOTE ADULT - ASSESSMENT
56 yo F with PMH of paroxysmal SVT and uterine leiomyomas who presents for scheduled total abdominal hysterectomy and bilateral salpingectomy. Noted with persistent tachycardia post-op, cardiology consulted for additional evaluation.    #Atrial tachycardia  - On review of EKG, patient with likely atrial tachycardia, consistent with known history with previous Holter monitor  - Likely induced in setting of recent surgery with adrenergic surge  - Currently asymptomatic, hemodynamically stable, no indication for cardioversion, would start medical management with metoprolol tartrate 25mg q8h, increase dose as tolerated  - Would continue to treat pain, fluid shifts, maintain K > 4, Mg > 2  - If atrial tachycardia persists > 24h post op would consult EP for further evaluation, may need to consider antiarrythmics vs. ablation    Bree Wiggins MD  Cardiology Fellow  Spectra [phone]  All Cardiology service information can be found 24/7 on [email or web address], password: [email or web address] 56 yo F with PMH of paroxysmal SVT and uterine leiomyomas who presents for scheduled total abdominal hysterectomy and bilateral salpingectomy. Noted with persistent tachycardia post-op, cardiology consulted for additional evaluation.    #Atrial tachycardia  - On review of EKG, patient with likely atrial tachycardia, consistent with known history with previous Holter monitor, TTE without structural disease  - Likely induced in setting of recent surgery with adrenergic surge, may break on own  - Currently asymptomatic, hemodynamically stable, no indication for cardioversion, would start rate control with metoprolol tartrate 25mg q8h, increase dose as tolerated  - Would continue to treat pain, manage post-op fluid changes, maintain K > 4, Mg > 2  - Recommend monitoring on telemetry, if atrial tachycardia persists > 24h post op or patient becomes symptomatic would consult EP for further evaluation, may need to consider antiarrythmics vs. ablation    Bree Wiggins MD  Cardiology Fellow  Spectra [phone]  All Cardiology service information can be found 24/7 on [email or web address], password: [email or web address] 54 yo F with PMH of paroxysmal SVT and uterine leiomyomas who presents for scheduled total abdominal hysterectomy and bilateral salpingectomy. Noted with persistent tachycardia post-op, cardiology consulted for additional evaluation.    #Atrial tachycardia  - On review of EKG, patient with likely atrial tachycardia, consistent with known history with previous Holter monitor, TTE without structural disease  - Likely induced in setting of recent surgery with adrenergic surge, may break on own  - Currently asymptomatic, hemodynamically stable, no indication for cardioversion, would start rate control with metoprolol tartrate 25mg q8h, increase dose as tolerated, can also give IV metoprolol 5mg PRN  - Would continue to treat pain, manage post-op fluid changes, maintain K > 4, Mg > 2  - Recommend monitoring on telemetry, if atrial tachycardia persists > 24h post op or patient becomes symptomatic would consult EP for further evaluation, may need to consider antiarrythmics vs. ablation    Bree Wiggins MD  Cardiology Fellow  Spectra [phone]  All Cardiology service information can be found 24/7 on [email or web address], password: [email or web address]

## 2020-10-23 NOTE — DISCHARGE NOTE PROVIDER - NSDCCPCAREPLAN_GEN_ALL_CORE_FT
PRINCIPAL DISCHARGE DIAGNOSIS  Diagnosis: Abnormal EKG  Assessment and Plan of Treatment:        PRINCIPAL DISCHARGE DIAGNOSIS  Diagnosis: Leiomyoma of uterus  Assessment and Plan of Treatment:

## 2020-10-23 NOTE — CONSULT NOTE ADULT - SUBJECTIVE AND OBJECTIVE BOX
Patient seen and evaluated at bedside    Chief Complaint: s/p CONSUELO    HPI:  Patient is a 54 yo F with PMH of paroxysmal SVT and uterine leiomyomas who presents for scheduled total abdominal hysterectomy and bilateral salpingectomy. Noted with persistent tachycardia post-op, cardiology consulted for additional evaluation.  Of note, patient seen by Dr. Garcia cardiology as outpatient for presurgical evaluation after noted with SVT, Holter monitor with 4% burden, suspect PAT vs PJT. Patient otherwise asymptomatic, TTE without evidence of structural disease. When seen at bedside post-op, denies chest pain or palpitations with tachycardia.  Per primary team, tolerated procedure well, with 750cc EBL, s/p 2L fluids post procedure. HR ~120 since surgery.    PMHx:   Uterine leiomyoma    PSHx:   S/P bunionectomy    S/P removal of ovarian cyst      Allergies:  No Known Allergies    Current Medications:   acetaminophen   Tablet .. 975 milliGRAM(s) Oral every 6 hours  ketorolac   Injectable 15 milliGRAM(s) IV Push every 6 hours  lactated ringers. 1000 milliLiter(s) IV Continuous <Continuous>  lactated ringers. 1000 milliLiter(s) IV Continuous <Continuous>  ondansetron Injectable 4 milliGRAM(s) IV Push every 4 hours PRN  oxycodone    5 mG/acetaminophen 325 mG 1 Tablet(s) Oral every 4 hours PRN  oxycodone    5 mG/acetaminophen 325 mG 2 Tablet(s) Oral every 6 hours PRN    FAMILY HISTORY:  FH: lung cancer  father    FH: breast cancer  mother    FH: stroke  sister    Social History:  Smoking History: Denies    REVIEW OF SYSTEMS:  CONSTITUTIONAL: No weakness, fevers or chills  EYES/ENT: No visual changes;  No dysphagia  NECK: No pain or stiffness  RESPIRATORY: No cough, wheezing, hemoptysis; No shortness of breath  CARDIOVASCULAR: No chest pain or palpitations; No lower extremity edema  GASTROINTESTINAL: No abdominal or epigastric pain. No nausea, vomiting, or hematemesis; No diarrhea or constipation. No melena or hematochezia. +post surgical pain  BACK: No back pain  GENITOURINARY: No dysuria, frequency or hematuria  NEUROLOGICAL: No numbness or weakness  SKIN: No itching, burning, rashes, or lesions   All other review of systems is negative unless indicated above.    Physical Exam:  T(F): 97.9 (10-23), Max: 98.9 (10-23)  HR: 119 (10-23) (75 - 121)  BP: 128/75 (10-23) (112/68 - 135/75)  RR: 13 (10-23)  SpO2: 99% (10-23)  GENERAL: No acute distress, well-developed  HEAD:  Atraumatic, Normocephalic  ENT: EOMI, conjunctiva and sclera clear, Neck supple, No JVD  CHEST/LUNG: Clear to auscultation bilaterally anteriorly  HEART: Tachycardic, regular rhythm, equal S1 and S2, no murmurs  ABDOMEN: Soft, post-surgical changes  EXTREMITIES:  No clubbing, cyanosis, or edema  PSYCH: Nl behavior, nl affect  NEUROLOGY: AAOx3, non-focal, cranial nerves grossly intact  SKIN: Normal color    LINES:    Cardiovascular Diagnostic Testing:    ECG: Atypical p waves, suspect atrial tachycardia, no ST elevation, nonspecific ST flatting V2-V3    Echo:  Outpatient 10/20/20  Normal LV size and wall thickness, with normal systolic and diastolic dysfunction.    Labs: Personally reviewed    CARDIAC MARKERS ( 23 Oct 2020 17:35 )  x     / x     / x     / 145 u/L / 1.35 ng/mL / x

## 2020-10-23 NOTE — DISCHARGE NOTE PROVIDER - NSDCMRMEDTOKEN_GEN_ALL_CORE_FT
estradiol-levonorgestrel: 1  orally once a day   estradiol-levonorgestrel: 1  orally once a day  oxyCODONE 5 mg oral tablet: 1 tab(s) orally every 6 hours MDD:4 tabs   acetaminophen 325 mg oral tablet: 3 tab(s) orally every 6 hours  ibuprofen 200 mg oral tablet: 3 tab(s) orally every 6 hours  metoprolol tartrate 25 mg oral tablet: 1 tab(s) orally 2 times a day   oxyCODONE 5 mg oral tablet: 1 tab(s) orally every 6 hours MDD:4 tabs

## 2020-10-23 NOTE — CHART NOTE - NSCHARTNOTEFT_GEN_A_CORE
PACU course notable for continued persistent tachycardia in 120's despite metoprolol2 25mg PO, metoprolol 5mg IV . Patient denies CP, SOB. N/V  Esmolol 50mg IV pushed with no  subsequent change in heart rate, additional metoprolol 5 mg IV pushed followed by vagal maneuver.   Patient returned to normal sinus rate 60's.

## 2020-10-24 DIAGNOSIS — Z98.890 OTHER SPECIFIED POSTPROCEDURAL STATES: ICD-10-CM

## 2020-10-24 LAB
BASOPHILS # BLD AUTO: 0.01 K/UL — SIGNIFICANT CHANGE UP (ref 0–0.2)
BASOPHILS NFR BLD AUTO: 0.1 % — SIGNIFICANT CHANGE UP (ref 0–2)
EOSINOPHIL # BLD AUTO: 0 K/UL — SIGNIFICANT CHANGE UP (ref 0–0.5)
EOSINOPHIL NFR BLD AUTO: 0 % — SIGNIFICANT CHANGE UP (ref 0–6)
HCT VFR BLD CALC: 29.5 % — LOW (ref 34.5–45)
HCT VFR BLD CALC: 29.7 % — LOW (ref 34.5–45)
HGB BLD-MCNC: 9.6 G/DL — LOW (ref 11.5–15.5)
HGB BLD-MCNC: 9.7 G/DL — LOW (ref 11.5–15.5)
IMM GRANULOCYTES NFR BLD AUTO: 0.3 % — SIGNIFICANT CHANGE UP (ref 0–1.5)
LYMPHOCYTES # BLD AUTO: 0.94 K/UL — LOW (ref 1–3.3)
LYMPHOCYTES # BLD AUTO: 9.7 % — LOW (ref 13–44)
MCHC RBC-ENTMCNC: 28.8 PG — SIGNIFICANT CHANGE UP (ref 27–34)
MCHC RBC-ENTMCNC: 28.8 PG — SIGNIFICANT CHANGE UP (ref 27–34)
MCHC RBC-ENTMCNC: 32.5 % — SIGNIFICANT CHANGE UP (ref 32–36)
MCHC RBC-ENTMCNC: 32.7 % — SIGNIFICANT CHANGE UP (ref 32–36)
MCV RBC AUTO: 88.1 FL — SIGNIFICANT CHANGE UP (ref 80–100)
MCV RBC AUTO: 88.6 FL — SIGNIFICANT CHANGE UP (ref 80–100)
MONOCYTES # BLD AUTO: 0.7 K/UL — SIGNIFICANT CHANGE UP (ref 0–0.9)
MONOCYTES NFR BLD AUTO: 7.2 % — SIGNIFICANT CHANGE UP (ref 2–14)
NEUTROPHILS # BLD AUTO: 7.98 K/UL — HIGH (ref 1.8–7.4)
NEUTROPHILS NFR BLD AUTO: 82.7 % — HIGH (ref 43–77)
NRBC # FLD: 0 K/UL — SIGNIFICANT CHANGE UP (ref 0–0)
NRBC # FLD: 0 K/UL — SIGNIFICANT CHANGE UP (ref 0–0)
PLATELET # BLD AUTO: 263 K/UL — SIGNIFICANT CHANGE UP (ref 150–400)
PLATELET # BLD AUTO: 273 K/UL — SIGNIFICANT CHANGE UP (ref 150–400)
PMV BLD: 9 FL — SIGNIFICANT CHANGE UP (ref 7–13)
PMV BLD: 9.2 FL — SIGNIFICANT CHANGE UP (ref 7–13)
RBC # BLD: 3.33 M/UL — LOW (ref 3.8–5.2)
RBC # BLD: 3.37 M/UL — LOW (ref 3.8–5.2)
RBC # FLD: 13.9 % — SIGNIFICANT CHANGE UP (ref 10.3–14.5)
RBC # FLD: 13.9 % — SIGNIFICANT CHANGE UP (ref 10.3–14.5)
WBC # BLD: 9.66 K/UL — SIGNIFICANT CHANGE UP (ref 3.8–10.5)
WBC # BLD: 9.94 K/UL — SIGNIFICANT CHANGE UP (ref 3.8–10.5)
WBC # FLD AUTO: 9.66 K/UL — SIGNIFICANT CHANGE UP (ref 3.8–10.5)
WBC # FLD AUTO: 9.94 K/UL — SIGNIFICANT CHANGE UP (ref 3.8–10.5)

## 2020-10-24 PROCEDURE — 99232 SBSQ HOSP IP/OBS MODERATE 35: CPT | Mod: GC

## 2020-10-24 RX ORDER — HEPARIN SODIUM 5000 [USP'U]/ML
5000 INJECTION INTRAVENOUS; SUBCUTANEOUS EVERY 12 HOURS
Refills: 0 | Status: DISCONTINUED | OUTPATIENT
Start: 2020-10-24 | End: 2020-10-26

## 2020-10-24 RX ORDER — METOPROLOL TARTRATE 50 MG
25 TABLET ORAL EVERY 8 HOURS
Refills: 0 | Status: DISCONTINUED | OUTPATIENT
Start: 2020-10-24 | End: 2020-10-25

## 2020-10-24 RX ADMIN — Medication 975 MILLIGRAM(S): at 11:13

## 2020-10-24 RX ADMIN — Medication 15 MILLIGRAM(S): at 17:45

## 2020-10-24 RX ADMIN — Medication 15 MILLIGRAM(S): at 12:00

## 2020-10-24 RX ADMIN — Medication 15 MILLIGRAM(S): at 17:07

## 2020-10-24 RX ADMIN — Medication 975 MILLIGRAM(S): at 22:00

## 2020-10-24 RX ADMIN — HEPARIN SODIUM 5000 UNIT(S): 5000 INJECTION INTRAVENOUS; SUBCUTANEOUS at 17:07

## 2020-10-24 RX ADMIN — Medication 975 MILLIGRAM(S): at 05:09

## 2020-10-24 RX ADMIN — Medication 975 MILLIGRAM(S): at 17:45

## 2020-10-24 RX ADMIN — Medication 15 MILLIGRAM(S): at 11:13

## 2020-10-24 RX ADMIN — Medication 15 MILLIGRAM(S): at 05:10

## 2020-10-24 RX ADMIN — Medication 975 MILLIGRAM(S): at 12:00

## 2020-10-24 RX ADMIN — Medication 975 MILLIGRAM(S): at 21:59

## 2020-10-24 RX ADMIN — Medication 15 MILLIGRAM(S): at 05:09

## 2020-10-24 RX ADMIN — Medication 975 MILLIGRAM(S): at 17:07

## 2020-10-24 NOTE — PROGRESS NOTE ADULT - SUBJECTIVE AND OBJECTIVE BOX
GYN PROGRESS NOTE    Delayed note entry; pt evaluated @ 06:30AM    POD#1   HD#2    Patient seen and examined at bedside. Pt was evaluated by Cardiology in PACU for inverted T waves on EKG and tachycardia in setting of h/o abnl EKG @ PST.   This AM, pt without acute complaints. Reports pain well controlled. Patient is ambulating, passing flatus, and tolerating regular diet. +Chang.  Denies CP, SOB, N/V, fevers, and chills.    Vital Signs Last 24 Hours  T(C): 36.8 (10-24-20 @ 11:11), Max: 37.2 (10-23-20 @ 12:23)  HR: 66 (10-24-20 @ 11:11) (59 - 121)  BP: 89/48 (10-24-20 @ 11:11) (89/47 - 135/75)  RR: 18 (10-24-20 @ 11:11) (8 - 18)  SpO2: 98% (10-24-20 @ 11:11) (90% - 100%)    I&O's Summary    23 Oct 2020 07:01  -  24 Oct 2020 07:00  --------------------------------------------------------  IN: 1220 mL / OUT: 1230 mL / NET: -10 mL    24 Oct 2020 07:01  -  24 Oct 2020 11:21  --------------------------------------------------------  IN: 0 mL / OUT: 450 mL / NET: -450 mL    Physical Exam:  General: NAD  CV: NR, RR, S1, S2, no M/R/G  Lungs: CTA-B  Abdomen: Soft, non-tender, non-distended, +BS  Incision: Pfannenstiel C/D/I  : +Chang, clear urine  Ext: No pain or swelling    Labs:                        9.7    9.66  )-----------( 273      ( 24 Oct 2020 06:05 )             29.7   baso 0.1    eos 0.0    imm gran 0.3    lymph 9.7    mono 7.2    poly 82.7       MEDICATIONS  (STANDING):  acetaminophen   Tablet .. 975 milliGRAM(s) Oral every 6 hours  influenza   Vaccine 0.5 milliLiter(s) IntraMuscular once  ketorolac   Injectable 15 milliGRAM(s) IV Push every 6 hours  lactated ringers. 1000 milliLiter(s) (125 mL/Hr) IV Continuous <Continuous>    MEDICATIONS  (PRN):  ondansetron Injectable 4 milliGRAM(s) IV Push every 4 hours PRN Nausea and/or Vomiting  oxyCODONE    IR 5 milliGRAM(s) Oral every 4 hours PRN Severe Pain (7 - 10)

## 2020-10-24 NOTE — PROGRESS NOTE ADULT - ASSESSMENT
54yo POD#1 s/p CONSUELO-BS. PACU course notable for tachycardia and abnormal EKG findings. Postoperative course additionally notable for downtrend in H/H greater than expected.

## 2020-10-25 LAB
HCT VFR BLD CALC: 27.4 % — LOW (ref 34.5–45)
HGB BLD-MCNC: 9 G/DL — LOW (ref 11.5–15.5)
MCHC RBC-ENTMCNC: 28.5 PG — SIGNIFICANT CHANGE UP (ref 27–34)
MCHC RBC-ENTMCNC: 32.8 % — SIGNIFICANT CHANGE UP (ref 32–36)
MCV RBC AUTO: 86.7 FL — SIGNIFICANT CHANGE UP (ref 80–100)
NRBC # FLD: 0 K/UL — SIGNIFICANT CHANGE UP (ref 0–0)
PLATELET # BLD AUTO: 227 K/UL — SIGNIFICANT CHANGE UP (ref 150–400)
PMV BLD: 8.9 FL — SIGNIFICANT CHANGE UP (ref 7–13)
RBC # BLD: 3.16 M/UL — LOW (ref 3.8–5.2)
RBC # FLD: 14.1 % — SIGNIFICANT CHANGE UP (ref 10.3–14.5)
WBC # BLD: 9.36 K/UL — SIGNIFICANT CHANGE UP (ref 3.8–10.5)
WBC # FLD AUTO: 9.36 K/UL — SIGNIFICANT CHANGE UP (ref 3.8–10.5)

## 2020-10-25 PROCEDURE — 99232 SBSQ HOSP IP/OBS MODERATE 35: CPT | Mod: GC

## 2020-10-25 RX ORDER — SODIUM CHLORIDE 9 MG/ML
1000 INJECTION, SOLUTION INTRAVENOUS ONCE
Refills: 0 | Status: COMPLETED | OUTPATIENT
Start: 2020-10-25 | End: 2020-10-25

## 2020-10-25 RX ORDER — METOPROLOL TARTRATE 50 MG
25 TABLET ORAL
Refills: 0 | Status: DISCONTINUED | OUTPATIENT
Start: 2020-10-25 | End: 2020-10-26

## 2020-10-25 RX ORDER — BENZOCAINE AND MENTHOL 5; 1 G/100ML; G/100ML
1 LIQUID ORAL EVERY 8 HOURS
Refills: 0 | Status: DISCONTINUED | OUTPATIENT
Start: 2020-10-25 | End: 2020-10-26

## 2020-10-25 RX ORDER — METOPROLOL TARTRATE 50 MG
25 TABLET ORAL
Refills: 0 | Status: DISCONTINUED | OUTPATIENT
Start: 2020-10-25 | End: 2020-10-25

## 2020-10-25 RX ADMIN — Medication 975 MILLIGRAM(S): at 21:45

## 2020-10-25 RX ADMIN — OXYCODONE HYDROCHLORIDE 5 MILLIGRAM(S): 5 TABLET ORAL at 11:58

## 2020-10-25 RX ADMIN — HEPARIN SODIUM 5000 UNIT(S): 5000 INJECTION INTRAVENOUS; SUBCUTANEOUS at 05:58

## 2020-10-25 RX ADMIN — BENZOCAINE AND MENTHOL 1 LOZENGE: 5; 1 LIQUID ORAL at 13:33

## 2020-10-25 RX ADMIN — SODIUM CHLORIDE 1000 MILLILITER(S): 9 INJECTION, SOLUTION INTRAVENOUS at 13:33

## 2020-10-25 RX ADMIN — HEPARIN SODIUM 5000 UNIT(S): 5000 INJECTION INTRAVENOUS; SUBCUTANEOUS at 17:16

## 2020-10-25 RX ADMIN — Medication 975 MILLIGRAM(S): at 21:12

## 2020-10-25 RX ADMIN — OXYCODONE HYDROCHLORIDE 5 MILLIGRAM(S): 5 TABLET ORAL at 21:12

## 2020-10-25 RX ADMIN — OXYCODONE HYDROCHLORIDE 5 MILLIGRAM(S): 5 TABLET ORAL at 11:22

## 2020-10-25 RX ADMIN — OXYCODONE HYDROCHLORIDE 5 MILLIGRAM(S): 5 TABLET ORAL at 05:57

## 2020-10-25 RX ADMIN — OXYCODONE HYDROCHLORIDE 5 MILLIGRAM(S): 5 TABLET ORAL at 21:45

## 2020-10-25 RX ADMIN — Medication 975 MILLIGRAM(S): at 18:00

## 2020-10-25 RX ADMIN — Medication 25 MILLIGRAM(S): at 15:31

## 2020-10-25 RX ADMIN — Medication 975 MILLIGRAM(S): at 17:16

## 2020-10-25 RX ADMIN — Medication 975 MILLIGRAM(S): at 11:22

## 2020-10-25 RX ADMIN — Medication 975 MILLIGRAM(S): at 11:57

## 2020-10-25 RX ADMIN — BENZOCAINE AND MENTHOL 1 LOZENGE: 5; 1 LIQUID ORAL at 21:16

## 2020-10-25 RX ADMIN — OXYCODONE HYDROCHLORIDE 5 MILLIGRAM(S): 5 TABLET ORAL at 00:55

## 2020-10-25 NOTE — PROGRESS NOTE ADULT - PROBLEM SELECTOR PLAN 1
Neuro: Tylenol, Toradol, Oxy PRN  CV: hemodynamics notable for improvement in HR, however, now with lower range BPs and downtrend in H/H greater than expected (Hct: 41.3->29.7), will repeat at 12PM  - Metoprolol tartrate 25mg q8hr with hold parameters, Tele bed  - Cardiac enzymes wnl  - SVT on PST EKG; outpt workup with Cards with TTE wnl  - Appreciate Cards recs  Pulm: Saturating well on room air. Encourage OOB and incentive spirometer use.   FEN: SLIV, reg diet  : Chang to gravity, pending repeat CBC  Heme: DVT ppx w/ SCD's while in bed. Early ambulation, initially with assistance then as tolerated. HSQ pending repeat CBC.   ID: Afebrile  Dispo: inpt postop care    Iva Bonilla R2
Neuro: Tylenol, Toradol, Oxy PRN  CV: intermittent atrial tachycardia, hemodynamics notable for improvement in HR, stable H/H  - Metoprolol tartrate 25mg q8hr with hold parameters, Tele bed  - Cardiac enzymes wnl  - SVT on PST EKG; outpt workup with Cards with TTE wnl  - Appreciate Cards recs  Pulm: Saturating well on room air. Encourage OOB and incentive spirometer use.   FEN: SLIV, reg diet  : voiding freely  Heme: DVT ppx w/ HSQ, SCD's while in bed, OOB  ID: Afebrile  Dispo: inpt postop care    Iva Bonilla R2.

## 2020-10-25 NOTE — PROGRESS NOTE ADULT - SUBJECTIVE AND OBJECTIVE BOX
Patient seen and examined at bedside.    Overnight Events:   Asymptomatic  Feels well  Denies chest pain or SOB  Denies palpitation  Tele show frequent ATach to 130s  OB called for re-eval  Has not received metop since 10/23    REVIEW OF SYSTEMS:  CONSTITUTIONAL: No weakness, fevers or chills  EYES/ENT: No visual changes;  No dysphagia  NECK: No pain or stiffness  RESPIRATORY: No cough, wheezing, hemoptysis; No shortness of breath  CARDIOVASCULAR: No chest pain or palpitations; No lower extremity edema  GASTROINTESTINAL: No abdominal or epigastric pain. No nausea, vomiting, or hematemesis; No diarrhea or constipation. No melena or hematochezia.  BACK: No back pain  GENITOURINARY: No dysuria, frequency or hematuria  NEUROLOGICAL: No numbness or weakness  SKIN: No itching, burning, rashes, or lesions   All other review of systems is negative unless indicated above.            Current Meds:  acetaminophen   Tablet .. 975 milliGRAM(s) Oral every 6 hours  benzocaine 15 mG/menthol 3.6 mG (Sugar-Free) Lozenge 1 Lozenge Oral every 8 hours  heparin   Injectable 5000 Unit(s) SubCutaneous every 12 hours  influenza   Vaccine 0.5 milliLiter(s) IntraMuscular once  lactated ringers Bolus 1000 milliLiter(s) IV Bolus once  ondansetron Injectable 4 milliGRAM(s) IV Push every 4 hours PRN  oxyCODONE    IR 5 milliGRAM(s) Oral every 4 hours PRN      Vitals:  T(F): 98.5 (10-25), Max: 98.7 (10-24)  HR: 74 (10-25) (74 - 89)  BP: 99/47 (10-25) (95/47 - 106/64)  RR: 18 (10-25)  SpO2: 97% (10-25)  I&O's Summary    24 Oct 2020 07:01  -  25 Oct 2020 07:00  --------------------------------------------------------  IN: 0 mL / OUT: 1200 mL / NET: -1200 mL    25 Oct 2020 07:01  -  25 Oct 2020 13:12  --------------------------------------------------------  IN: 0 mL / OUT: 400 mL / NET: -400 mL        Physical Exam:  Appearance: No acute distress; well appearing  Eyes: PERRL, EOMI, pink conjunctiva  HENT: Normal oral mucosa  Cardiovascular: RRR, S1, S2, no murmurs, rubs, or gallops; no edema; no JVD  Respiratory: Clear to auscultation bilaterally  Gastrointestinal: soft, non-tender, non-distended with normal bowel sounds  Musculoskeletal: No clubbing; no joint deformity   Neurologic: Non-focal  Lymphatic: No lymphadenopathy  Psychiatry: AAOx3, mood & affect appropriate  Skin: No rashes, ecchymoses, or cyanosis                          9.0    9.36  )-----------( 227      ( 25 Oct 2020 05:33 )             27.4             CARDIAC MARKERS ( 23 Oct 2020 17:35 )  x     / x     / 145 u/L / 1.35 ng/mL / x

## 2020-10-25 NOTE — PROGRESS NOTE ADULT - SUBJECTIVE AND OBJECTIVE BOX
GYN PROGRESS NOTE    POD# 2   HD# 3    Patient seen and examined at bedside, no acute overnight events. Tele events reviewed and pt with intermittent atrial tachycardia x15min (bouncing from 80->130s). On bedside evaluation, no acute complaints. Chang was removed at 1700 yesterday, and pt voided. Denies feeling palpitations. Reports adequate pain well controlled. +OOB w/o lightheadedness, dizziness. +Flatus. +Voiding freely. Tolerating regular diet. Denies CP, SOB, N/V, fevers, and chills.    Vital Signs Last 24 Hours  T(C): 37.1 (10-25-20 @ 05:50), Max: 37.1 (10-24-20 @ 19:35)  HR: 89 (10-25-20 @ 05:50) (63 - 89)  BP: 106/64 (10-25-20 @ 05:50) (89/47 - 106/64)  RR: 18 (10-25-20 @ 05:50) (17 - 18)  SpO2: 96% (10-25-20 @ 05:50) (95% - 100%)    I&O's Summary    24 Oct 2020 07:01  -  25 Oct 2020 07:00  --------------------------------------------------------  IN: 0 mL / OUT: 1200 mL / NET: -1200 mL      Physical Exam:  General: NAD  CV: Normal S1/S2, RRR; no tachycardia or rapid change in rate/rhythm appreciated  Lungs: CTA-B  Abdomen: Soft, non-tender, non-distended, +BS  Incision: Pfannenstiel C/D/I w/dermabond  : clean chux   Ext: No pain or swelling    Labs:                        9.0    9.36  )-----------( 227      ( 25 Oct 2020 05:33 )             27.4                           9.6    9.94  )-----------( 263      ( 24 Oct 2020 11:53 )             29.5                           9.7    9.66  )-----------( 273      ( 24 Oct 2020 06:05 )             29.7     baso 0.1    eos 0.0    imm gran 0.3    lymph 9.7    mono 7.2    poly 82.7       MEDICATIONS  (STANDING):  acetaminophen   Tablet .. 975 milliGRAM(s) Oral every 6 hours  heparin   Injectable 5000 Unit(s) SubCutaneous every 12 hours  influenza   Vaccine 0.5 milliLiter(s) IntraMuscular once  metoprolol tartrate 25 milliGRAM(s) Oral every 8 hours    MEDICATIONS  (PRN):  ondansetron Injectable 4 milliGRAM(s) IV Push every 4 hours PRN Nausea and/or Vomiting  oxyCODONE    IR 5 milliGRAM(s) Oral every 4 hours PRN Severe Pain (7 - 10)

## 2020-10-25 NOTE — PROGRESS NOTE ADULT - ASSESSMENT
54 yo F with PMH of paroxysmal SVT and uterine leiomyomas who presents for scheduled total abdominal hysterectomy and bilateral salpingectomy. Noted with persistent tachycardia post-op, cardiology consulted for additional evaluation.    #paroxysmal Atrial tachycardia  - On review of EKG, patient with likely atrial tachycardia, consistent with known history with previous Holter monitor, TTE without structural disease  - Likely induced in setting of recent surgery with adrenergic surge and now pain.  - Currently asymptomatic, hemodynamically stable (though soft BPs 90s/60s, even though off metop >24 hours). Would give IVF and start rate control with metoprolol tartrate 25mg BID, increase dose as tolerated, can also give IV metoprolol 5mg PRN  - Would continue to treat pain, manage post-op fluid changes (appears euvolemic to dry), maintain K > 4, Mg > 2  - Depending on fluids, IVF and metop, will consider EP eval on monday if pAT incessant.    For all cardiology related questions, please call the current cardiology fellow on service at this time.  ** Please go to [email or web address] ; login: [email or web address] (case-sensitive) **    Ras Kapadia MD  Department of Cardiovascular Disease

## 2020-10-25 NOTE — PROGRESS NOTE ADULT - ASSESSMENT
56yo POD#2 s/p CONSUELO-BS. PACU course notable for tachycardia and abnormal EKG findings. Postoperative course additionally notable for downtrend in Hgb from 13.6->9.7->9.6->9.0 this AM. Pt without symptomatic complaints of anemia.    Neuro: Tylenol, Toradol, Oxy PRN  CV: intermittent atrial tachycardia hemodynamics notable for improvement in HR, however, now with lower range BPs and downtrend in H/H greater than expected (Hct: 41.3->29.7), will repeat at 12PM  - Metoprolol tartrate 25mg q8hr with hold parameters, Tele bed  - Cardiac enzymes wnl  - SVT on PST EKG; outpt workup with Cards with TTE wnl  - Appreciate Cards recs  Pulm: Saturating well on room air. Encourage OOB and incentive spirometer use.   FEN: SLIV, reg diet  : Chang to gravity, pending repeat CBC  Heme: DVT ppx w/ SCD's while in bed. Early ambulation, initially with assistance then as tolerated. HSQ pending repeat CBC.   ID: Afebrile  Dispo: inpt postop care    Iva Bonilla R2. 56yo POD#2 s/p CONSUELO-BS. PACU course notable for tachycardia and abnormal EKG findings. Postoperative course additionally notable for downtrend in Hgb from 13.6->9.7->9.6->9.0 this AM. Pt without symptomatic complaints of anemia.

## 2020-10-26 ENCOUNTER — TRANSCRIPTION ENCOUNTER (OUTPATIENT)
Age: 55
End: 2020-10-26

## 2020-10-26 VITALS
RESPIRATION RATE: 18 BRPM | OXYGEN SATURATION: 99 % | HEART RATE: 60 BPM | TEMPERATURE: 98 F | SYSTOLIC BLOOD PRESSURE: 97 MMHG | DIASTOLIC BLOOD PRESSURE: 40 MMHG

## 2020-10-26 LAB
HCT VFR BLD CALC: 28.7 % — LOW (ref 34.5–45)
HGB BLD-MCNC: 9.2 G/DL — LOW (ref 11.5–15.5)
MCHC RBC-ENTMCNC: 28.9 PG — SIGNIFICANT CHANGE UP (ref 27–34)
MCHC RBC-ENTMCNC: 32.1 % — SIGNIFICANT CHANGE UP (ref 32–36)
MCV RBC AUTO: 90.3 FL — SIGNIFICANT CHANGE UP (ref 80–100)
NRBC # FLD: 0 K/UL — SIGNIFICANT CHANGE UP (ref 0–0)
PLATELET # BLD AUTO: 255 K/UL — SIGNIFICANT CHANGE UP (ref 150–400)
PMV BLD: 9.6 FL — SIGNIFICANT CHANGE UP (ref 7–13)
RBC # BLD: 3.18 M/UL — LOW (ref 3.8–5.2)
RBC # FLD: 14.4 % — SIGNIFICANT CHANGE UP (ref 10.3–14.5)
WBC # BLD: 9.15 K/UL — SIGNIFICANT CHANGE UP (ref 3.8–10.5)
WBC # FLD AUTO: 9.15 K/UL — SIGNIFICANT CHANGE UP (ref 3.8–10.5)

## 2020-10-26 PROCEDURE — 99232 SBSQ HOSP IP/OBS MODERATE 35: CPT

## 2020-10-26 RX ORDER — ESTRADIOL AND LEVONORGESTREL 4.4; 1.39 MG/1; MG/1
1 PATCH TRANSDERMAL
Qty: 0 | Refills: 0 | DISCHARGE

## 2020-10-26 RX ORDER — METOPROLOL TARTRATE 50 MG
1 TABLET ORAL
Qty: 60 | Refills: 0
Start: 2020-10-26 | End: 2020-11-24

## 2020-10-26 RX ORDER — OXYCODONE HYDROCHLORIDE 5 MG/1
1 TABLET ORAL
Qty: 15 | Refills: 0
Start: 2020-10-26

## 2020-10-26 RX ORDER — IBUPROFEN 200 MG
3 TABLET ORAL
Qty: 0 | Refills: 0 | DISCHARGE

## 2020-10-26 RX ORDER — ACETAMINOPHEN 500 MG
3 TABLET ORAL
Qty: 0 | Refills: 0 | DISCHARGE
Start: 2020-10-26

## 2020-10-26 RX ADMIN — Medication 25 MILLIGRAM(S): at 05:03

## 2020-10-26 RX ADMIN — OXYCODONE HYDROCHLORIDE 5 MILLIGRAM(S): 5 TABLET ORAL at 03:00

## 2020-10-26 RX ADMIN — Medication 975 MILLIGRAM(S): at 05:03

## 2020-10-26 RX ADMIN — HEPARIN SODIUM 5000 UNIT(S): 5000 INJECTION INTRAVENOUS; SUBCUTANEOUS at 05:03

## 2020-10-26 RX ADMIN — BENZOCAINE AND MENTHOL 1 LOZENGE: 5; 1 LIQUID ORAL at 05:03

## 2020-10-26 RX ADMIN — Medication 975 MILLIGRAM(S): at 12:11

## 2020-10-26 RX ADMIN — OXYCODONE HYDROCHLORIDE 5 MILLIGRAM(S): 5 TABLET ORAL at 12:16

## 2020-10-26 RX ADMIN — OXYCODONE HYDROCHLORIDE 5 MILLIGRAM(S): 5 TABLET ORAL at 02:32

## 2020-10-26 NOTE — PROGRESS NOTE ADULT - ATTENDING COMMENTS
Personally saw and examined patient  Labs and vitals reviewed  Agree with assessment and plan above
Dose Lopressor 25 bid. Hold for SBP<90.

## 2020-10-26 NOTE — PROGRESS NOTE ADULT - ASSESSMENT
54 yo F with PMH of paroxysmal SVT and uterine leiomyomas who presents for scheduled total abdominal hysterectomy and bilateral salpingectomy. Noted with persistent tachycardia post-op, cardiology consulted for additional evaluation.    #paroxysmal Atrial tachycardia  Currently in NSR  Had brief episode overnight while sleeping  currently asymptomatic and rate controlled on metoprolol  Would switch to metoprolol succinate and discharge on 50mg PO daily.   No further evaluation needed.   Pt. can follow up as outpatient as scheduled    Gary Mendieta  Cardiology Fellow  [phone]    All Cardiology service information can be found 24/7 on [email or web address], password: "[email or web address]

## 2020-10-26 NOTE — PROGRESS NOTE ADULT - ASSESSMENT
A/P: 55y POD#3 s/p BELTRAN+BS, MARCUS, TAP Blocks.  Patient is stable and doing well, meeting all postop milestones.      Neuro: PO pain meds  CV: Hemodynamically stable, f/u AM CBC. Continue Metoprolol, cards following for history of SVT. No events overnight.  Pulm: Saturating well on room air, encourage oob/amb  GI: Continue regular diet  : UOP adequate, Voiding spontanously   Heme: c/w HSQ and SCDs for DVT ppx  ID: Afebrile  Endo: No active issues   Dispo: Discharge planning      CONSTANCE Pascual, PGY-3

## 2020-10-26 NOTE — PROGRESS NOTE ADULT - SUBJECTIVE AND OBJECTIVE BOX
R3 GYN Progress Note    POD#3   HD#4    Patient seen and examined at bedside.  No acute events overnight. No acute complaints.  Pain well controlled on current regimen.  Patient is ambulating and tolerating regular diet.  Patient is passing flatus.   Patient is voiding spontaneously   Denies CP, SOB, N/V, fevers, and chills.      Vital Signs Last 24 Hours  T(C): 36.8 (10-26-20 @ 04:59), Max: 37.1 (10-25-20 @ 19:17)  HR: 87 (10-26-20 @ 04:59) (70 - 112)  BP: 107/57 (10-26-20 @ 04:59) (89/44 - 117/48)  RR: 18 (10-26-20 @ 04:59) (18 - 18)  SpO2: 98% (10-26-20 @ 04:59) (94% - 100%)    I&O's Summary    24 Oct 2020 07:01  -  25 Oct 2020 07:00  --------------------------------------------------------  IN: 0 mL / OUT: 1200 mL / NET: -1200 mL    25 Oct 2020 07:01  -  26 Oct 2020 06:54  --------------------------------------------------------  IN: 0 mL / OUT: 1350 mL / NET: -1350 mL        Physical Exam:  General: NAD  CV: RR, S1, S2, no M/R/G  Lungs: CTA b/l, good air flow b/l   Abdomen: Soft, appropriately-tender, normoactive bowel sounds  Incision: CDI with dermabond in place  : no bleeding on pad  Ext: No pain or swelling       Labs:                        9.2    9.15  )-----------( 255      ( 26 Oct 2020 05:20 )             28.7   baso x      eos x      imm gran x      lymph x      mono x      poly x                            9.0    9.36  )-----------( 227      ( 25 Oct 2020 05:33 )             27.4   baso x      eos x      imm gran x      lymph x      mono x      poly x                            9.6    9.94  )-----------( 263      ( 24 Oct 2020 11:53 )             29.5   baso x      eos x      imm gran x      lymph x      mono x      poly x                            9.7    9.66  )-----------( 273      ( 24 Oct 2020 06:05 )             29.7   baso 0.1    eos 0.0    imm gran 0.3    lymph 9.7    mono 7.2    poly 82.7       MEDICATIONS  (STANDING):  acetaminophen   Tablet .. 975 milliGRAM(s) Oral every 6 hours  benzocaine 15 mG/menthol 3.6 mG (Sugar-Free) Lozenge 1 Lozenge Oral every 8 hours  heparin   Injectable 5000 Unit(s) SubCutaneous every 12 hours  influenza   Vaccine 0.5 milliLiter(s) IntraMuscular once  metoprolol tartrate 25 milliGRAM(s) Oral two times a day    MEDICATIONS  (PRN):  ondansetron Injectable 4 milliGRAM(s) IV Push every 4 hours PRN Nausea and/or Vomiting  oxyCODONE    IR 5 milliGRAM(s) Oral every 4 hours PRN Severe Pain (7 - 10)

## 2020-10-26 NOTE — DISCHARGE NOTE NURSING/CASE MANAGEMENT/SOCIAL WORK - PATIENT PORTAL LINK FT
You can access the FollowMyHealth Patient Portal offered by [facility] by registering at the following website: [email or web address]. By joining [facility]’s FollowMyHealth portal, you will also be able to view your health information using other applications (apps) compatible with our system.

## 2020-11-06 ENCOUNTER — APPOINTMENT (OUTPATIENT)
Dept: CARDIOLOGY | Facility: CLINIC | Age: 55
End: 2020-11-06
Payer: COMMERCIAL

## 2020-11-06 VITALS
SYSTOLIC BLOOD PRESSURE: 98 MMHG | WEIGHT: 177 LBS | BODY MASS INDEX: 26.83 KG/M2 | RESPIRATION RATE: 17 BRPM | OXYGEN SATURATION: 99 % | TEMPERATURE: 98.3 F | DIASTOLIC BLOOD PRESSURE: 61 MMHG | HEIGHT: 68 IN | HEART RATE: 59 BPM

## 2020-11-06 PROCEDURE — 99213 OFFICE O/P EST LOW 20 MIN: CPT

## 2020-11-06 PROCEDURE — 99072 ADDL SUPL MATRL&STAF TM PHE: CPT

## 2020-11-06 NOTE — REVIEW OF SYSTEMS
[see HPI] : see HPI [Abdominal Pain] : abdominal pain [Negative] : ENT [Fever] : no fever [Chills] : no chills [Blurry Vision] : no blurred vision [Eyeglasses] : not currently wearing eyeglasses [Dysphagia] : no dysphagia [Dysuria] : no dysuria [Incontinence] : no incontinence [Joint Pain] : no joint pain [Muscle Cramps] : no muscle cramps [Skin: A Rash] : no rash: [Dizziness] : no dizziness [Convulsions] : no convulsions [Confusion] : no confusion was observed [Anxiety] : no anxiety [Excessive Thirst] : no polydipsia [Easy Bleeding] : no tendency for easy bleeding [Easy Bruising] : no tendency for easy bruising

## 2020-11-06 NOTE — PHYSICAL EXAM
[Normal Appearance] : normal appearance [General Appearance - In No Acute Distress] : no acute distress [Eyelids - No Xanthelasma] : the eyelids demonstrated no xanthelasmas [Normal Jugular Venous A Waves Present] : normal jugular venous A waves present [Normal Jugular Venous V Waves Present] : normal jugular venous V waves present [No Jugular Venous Blanchard A Waves] : no jugular venous blanchard A waves [Respiration, Rhythm And Depth] : normal respiratory rhythm and effort [Auscultation Breath Sounds / Voice Sounds] : lungs were clear to auscultation bilaterally [Heart Rate And Rhythm] : heart rate and rhythm were normal [Heart Sounds] : normal S1 and S2 [Murmurs] : no murmurs present [Arterial Pulses Normal] : the arterial pulses were normal [Edema] : no peripheral edema present [Abdomen Soft] : soft [Abnormal Walk] : normal gait [Nail Clubbing] : no clubbing of the fingernails [Cyanosis, Localized] : no localized cyanosis [Skin Color & Pigmentation] : normal skin color and pigmentation [Skin Turgor] : normal skin turgor [] : no rash [Oriented To Time, Place, And Person] : oriented to person, place, and time [Impaired Insight] : insight and judgment were intact [FreeTextEntry1] : rare ectopic beat

## 2020-11-06 NOTE — HISTORY OF PRESENT ILLNESS
[FreeTextEntry1] : 55F who previously presented for asymptomatic PAT and preoperative eval who is now s/p CONSUELO/salpingectomy 10/23/2020. Postoperatively was found to have tachycardia and PAT and was started on metoprolol succinate 50mg daily. \par \par A holter monitor revealed 4% SVE burden with runs of PAT vs. PJT\par \par She presents for follow-up.\par \par pt reports no symptoms. no CP, SOB, URIBE, orthopnea, LE edema, dizziness, or LOC\par she has mild intermittent palpitations.\par prefers to be off medications as there has been no change in her symptoms.\par \par ECG from 10/16/2020:\par SR, possible LAE\par poor scan resolution, however appears to be pAT\par \par maternal gfather MI age 57

## 2020-11-06 NOTE — DISCUSSION/SUMMARY
[FreeTextEntry1] : paroxysmal SVT.\par TTE without structural heart disease\par largely asymptomatic\par \par -stop metoprolol succinate\par -continue avoidance of sympathomimetic supplements\par -no further cardiac testing at this time. pt instructed to return should she develop symptoms.

## 2020-11-11 ENCOUNTER — APPOINTMENT (OUTPATIENT)
Dept: OBGYN | Facility: CLINIC | Age: 55
End: 2020-11-11
Payer: COMMERCIAL

## 2020-11-11 VITALS
DIASTOLIC BLOOD PRESSURE: 66 MMHG | SYSTOLIC BLOOD PRESSURE: 108 MMHG | HEIGHT: 68 IN | WEIGHT: 177 LBS | BODY MASS INDEX: 26.83 KG/M2

## 2020-11-11 PROCEDURE — 99024 POSTOP FOLLOW-UP VISIT: CPT

## 2020-11-11 RX ORDER — LEVONORGESTREL AND ETHINYL ESTRADIOL 90; 20 UG/1; UG/1
90-20 TABLET ORAL DAILY
Qty: 90 | Refills: 1 | Status: COMPLETED | COMMUNITY
Start: 2018-10-05 | End: 2020-11-11

## 2020-11-11 NOTE — HISTORY OF PRESENT ILLNESS
[Pain is well-controlled] : pain is well-controlled [Fever] : no fever [Chills] : no chills [Nausea] : no nausea [Vomiting] : no vomiting [Clean/Dry/Intact] : clean, dry and intact [Erythema] : not erythematous [None] : no vaginal bleeding [Normal] : normal [de-identified] : CONSUELO, b/l salpingectomy [de-identified] : night sweats

## 2020-11-11 NOTE — PLAN
[No [place]] : to avoid sexual intercourse [Limited ADLs] : to participate in activities of daily living with limitations [No Work] : not to work [No Housework] : not to do housework [No Sports] : not to participate in sports [de-identified] : estradiol started for night sweats; f/u in 4 weeks

## 2020-11-17 ENCOUNTER — NON-APPOINTMENT (OUTPATIENT)
Age: 55
End: 2020-11-17

## 2020-11-18 NOTE — PROGRESS NOTE ADULT - SUBJECTIVE AND OBJECTIVE BOX
Patient seen and examined at bedside.    Overnight Events:   No overnight events    Current Meds:  acetaminophen   Tablet .. 975 milliGRAM(s) Oral every 6 hours  benzocaine 15 mG/menthol 3.6 mG (Sugar-Free) Lozenge 1 Lozenge Oral every 8 hours  heparin   Injectable 5000 Unit(s) SubCutaneous every 12 hours  influenza   Vaccine 0.5 milliLiter(s) IntraMuscular once  metoprolol tartrate 25 milliGRAM(s) Oral two times a day  ondansetron Injectable 4 milliGRAM(s) IV Push every 4 hours PRN  oxyCODONE    IR 5 milliGRAM(s) Oral every 4 hours PRN        Vitals:  T(F): 98.7 (10-26), Max: 98.7 (10-25)  HR: 56 (10-26) (56 - 112)  BP: 95/46 (10-26) (89/44 - 117/48)  RR: 18 (10-26)  SpO2: 99% (10-26)  I&O's Summary    25 Oct 2020 07:01  -  26 Oct 2020 07:00  --------------------------------------------------------  IN: 0 mL / OUT: 1350 mL / NET: -1350 mL        Physical Exam:  Appearance: No acute distress; well appearing  Eyes: PERRL, EOMI, pink conjunctiva  HENT: Normal oral mucosa  Cardiovascular: RRR, S1, S2, no murmurs, rubs, or gallops; no edema; no JVD  Respiratory: Clear to auscultation bilaterally  Gastrointestinal: soft, non-tender, non-distended with normal bowel sounds  Musculoskeletal: No clubbing; no joint deformity   Neurologic: Non-focal  Lymphatic: No lymphadenopathy  Psychiatry: AAOx3, mood & affect appropriate  Skin: No rashes, ecchymoses, or cyanosis                          9.2    9.15  )-----------( 255      ( 26 Oct 2020 05:20 )             28.7                         New ECG(s): Personally reviewed    Echo:  e  Stress Testing:     Cath:    Imaging:    Interpretation of Telemetry:  sinus 80 brief run of PAT overnight Implemented All Universal Safety Interventions:  [place] to call system. Call bell, personal items and telephone within reach. Instruct patient to call for assistance. Room bathroom lighting operational. Non-slip footwear when patient is off stretcher. Physically safe environment: no spills, clutter or unnecessary equipment. Stretcher in lowest position, wheels locked, appropriate side rails in place.

## 2020-11-20 LAB — SURGICAL PATHOLOGY STUDY: SIGNIFICANT CHANGE UP

## 2020-11-23 ENCOUNTER — NON-APPOINTMENT (OUTPATIENT)
Age: 55
End: 2020-11-23

## 2020-12-07 ENCOUNTER — APPOINTMENT (OUTPATIENT)
Dept: OBGYN | Facility: CLINIC | Age: 55
End: 2020-12-07

## 2020-12-09 ENCOUNTER — APPOINTMENT (OUTPATIENT)
Dept: OBGYN | Facility: CLINIC | Age: 55
End: 2020-12-09
Payer: COMMERCIAL

## 2020-12-09 VITALS
WEIGHT: 176 LBS | HEIGHT: 68 IN | SYSTOLIC BLOOD PRESSURE: 104 MMHG | DIASTOLIC BLOOD PRESSURE: 78 MMHG | BODY MASS INDEX: 26.67 KG/M2

## 2020-12-09 PROCEDURE — 99024 POSTOP FOLLOW-UP VISIT: CPT

## 2020-12-09 NOTE — HISTORY OF PRESENT ILLNESS
[Pain is well-controlled] : pain is well-controlled [Fever] : no fever [Chills] : no chills [Nausea] : no nausea [Vomiting] : no vomiting [Clean/Dry/Intact] : clean, dry and intact [Erythema] : not erythematous [None] : no vaginal bleeding [Normal] : normal [Pathology reviewed] : pathology reviewed [de-identified] : CONSUELO, b/l salpingectomy

## 2020-12-21 PROBLEM — Z01.419 ENCOUNTER FOR WELL WOMAN EXAM WITH ROUTINE GYNECOLOGICAL EXAM: Status: RESOLVED | Noted: 2018-10-08 | Resolved: 2020-12-21

## 2021-02-23 NOTE — PHYSICAL EXAM
[Mass] : no breast mass [Nipple Discharge] : no nipple discharge [Tender] : no tenderness [Axillary LAD] : no axillary lymphadenopathy Ketoconazole Counseling:   Patient counseled regarding improving absorption with orange juice.  Adverse effects include but are not limited to breast enlargement, headache, diarrhea, nausea, upset stomach, liver function test abnormalities, taste disturbance, and stomach pain.  There is a rare possibility of liver failure that can occur when taking ketoconazole. The patient understands that monitoring of LFTs may be required, especially at baseline. The patient verbalized understanding of the proper use and possible adverse effects of ketoconazole.  All of the patient's questions and concerns were addressed.

## 2021-03-02 ENCOUNTER — APPOINTMENT (OUTPATIENT)
Dept: OBGYN | Facility: CLINIC | Age: 56
End: 2021-03-02
Payer: COMMERCIAL

## 2021-03-02 VITALS
BODY MASS INDEX: 26.52 KG/M2 | HEIGHT: 68 IN | TEMPERATURE: 97 F | DIASTOLIC BLOOD PRESSURE: 58 MMHG | WEIGHT: 175 LBS | SYSTOLIC BLOOD PRESSURE: 100 MMHG

## 2021-03-02 DIAGNOSIS — Z09 ENCOUNTER FOR FOLLOW-UP EXAMINATION AFTER COMPLETED TREATMENT FOR CONDITIONS OTHER THAN MALIGNANT NEOPLASM: ICD-10-CM

## 2021-03-02 DIAGNOSIS — Z01.810 ENCOUNTER FOR PREPROCEDURAL CARDIOVASCULAR EXAMINATION: ICD-10-CM

## 2021-03-02 DIAGNOSIS — Z01.818 ENCOUNTER FOR OTHER PREPROCEDURAL EXAMINATION: ICD-10-CM

## 2021-03-02 DIAGNOSIS — Z86.018 PERSONAL HISTORY OF OTHER BENIGN NEOPLASM: ICD-10-CM

## 2021-03-02 PROCEDURE — 99396 PREV VISIT EST AGE 40-64: CPT

## 2021-03-02 PROCEDURE — 99072 ADDL SUPL MATRL&STAF TM PHE: CPT

## 2021-03-02 NOTE — PHYSICAL EXAM
[Appropriately responsive] : appropriately responsive [Alert] : alert [No Acute Distress] : no acute distress [No Lymphadenopathy] : no lymphadenopathy [Soft] : soft [Non-tender] : non-tender [Non-distended] : non-distended [No HSM] : No HSM [No Lesions] : no lesions [No Mass] : no mass [Oriented x3] : oriented x3 [Examination Of The Breasts] : a normal appearance [No Masses] : no breast masses were palpable [Labia Majora] : normal [Labia Minora] : normal [Normal] : normal [Absent] : absent [Uterine Adnexae] : non-palpable [Declined] : Patient declined rectal exam

## 2021-03-02 NOTE — PLAN
[FreeTextEntry1] : Mammo/sono yearly.\par Dexa\par \par Increase estradiol to 2g daily for hot flashes.  Reviewed risks.

## 2021-03-02 NOTE — HISTORY OF PRESENT ILLNESS
[FreeTextEntry1] : Check up.  Feels well.  Feels much better since surgery on 10/2020 [facility].  Hot flashes decreased with estradiol. STI testing refused.  Social distancing.

## 2021-04-19 ENCOUNTER — RX RENEWAL (OUTPATIENT)
Age: 56
End: 2021-04-19

## 2021-08-30 ENCOUNTER — RX RENEWAL (OUTPATIENT)
Age: 56
End: 2021-08-30

## 2021-09-10 DIAGNOSIS — R92.8 OTHER ABNORMAL AND INCONCLUSIVE FINDINGS ON DIAGNOSTIC IMAGING OF BREAST: ICD-10-CM

## 2021-09-14 ENCOUNTER — APPOINTMENT (OUTPATIENT)
Dept: OBGYN | Facility: CLINIC | Age: 56
End: 2021-09-14
Payer: COMMERCIAL

## 2021-09-14 VITALS
SYSTOLIC BLOOD PRESSURE: 110 MMHG | DIASTOLIC BLOOD PRESSURE: 62 MMHG | HEIGHT: 68 IN | BODY MASS INDEX: 27.58 KG/M2 | WEIGHT: 182 LBS

## 2021-09-14 PROCEDURE — 99213 OFFICE O/P EST LOW 20 MIN: CPT

## 2021-09-14 RX ORDER — ESTRADIOL 1 MG/1
1 TABLET ORAL
Qty: 90 | Refills: 0 | Status: DISCONTINUED | COMMUNITY
Start: 2021-04-19 | End: 2021-09-14

## 2021-10-05 DIAGNOSIS — N63.0 UNSPECIFIED LUMP IN UNSPECIFIED BREAST: ICD-10-CM

## 2022-02-02 NOTE — PATIENT PROFILE ADULT - NS PRO AD PATIENT TYPE ON CHART
[facility] Call Center    Phone Message    May a detailed message be left on voicemail: yes     Reason for Call: Other: Patient is reqwuesting a call back to schedule BOTOX, please calll patirnt at [phone] to assist.     Action Taken: Message routed to:  [facility] ([facility] Neurology    Travel Screening: Not Applicable                                                                       Health Care Proxy (HCP)

## 2022-03-02 ENCOUNTER — RX RENEWAL (OUTPATIENT)
Age: 57
End: 2022-03-02

## 2022-03-21 ENCOUNTER — APPOINTMENT (OUTPATIENT)
Dept: OBGYN | Facility: CLINIC | Age: 57
End: 2022-03-21
Payer: COMMERCIAL

## 2022-03-21 ENCOUNTER — NON-APPOINTMENT (OUTPATIENT)
Age: 57
End: 2022-03-21

## 2022-03-21 VITALS
HEIGHT: 68 IN | BODY MASS INDEX: 27.89 KG/M2 | SYSTOLIC BLOOD PRESSURE: 100 MMHG | DIASTOLIC BLOOD PRESSURE: 68 MMHG | WEIGHT: 184 LBS

## 2022-03-21 PROCEDURE — 99396 PREV VISIT EST AGE 40-64: CPT

## 2022-03-21 NOTE — HISTORY OF PRESENT ILLNESS
[Y] : Patient is sexually active [Monogamous (Male Partner)] : is monogamous with a male partner [FreeTextEntry1] : 57 yo presents for annual exam with complaint of continued night sweats and hot flashes with use of 2 mg of Estrace. [Mammogramdate] : 10/2021 [BreastSonogramDate] : 10/2021 [PapSmeardate] : 9/2017 [FreeTextEntry4] : Patient would like Gonorrhea and Chlamydia screening today.  Declines other STI blood work at this time\par

## 2022-03-21 NOTE — COUNSELING
[Nutrition/ Exercise/ Weight Management] : nutrition, exercise, weight management [Body Image] : body image [Breast Self Exam] : breast self exam [STD (testing, results, tx)] : STD (testing, results, tx) [Medication Management] : medication management

## 2022-03-21 NOTE — DISCUSSION/SUMMARY
[FreeTextEntry1] : Increased Estrace to 3 mg\par follow up breast sonogram\par RTO in 6 months for follow up

## 2022-03-21 NOTE — REVIEW OF SYSTEMS
[Night Sweats] : night sweats [Breast Lump] : breast lump [Hot Flashes] : hot flashes [Negative] : Heme/Lymph

## 2022-03-21 NOTE — PHYSICAL EXAM
[Appropriately responsive] : appropriately responsive [Alert] : alert [No Acute Distress] : no acute distress [Soft] : soft [Non-tender] : non-tender [Examination Of The Breasts] : a normal appearance [No Masses] : no breast masses were palpable [Labia Majora] : normal [Labia Minora] : normal [Normal] : normal [Absent] : absent

## 2022-03-22 LAB
C TRACH RRNA SPEC QL NAA+PROBE: NOT DETECTED
N GONORRHOEA RRNA SPEC QL NAA+PROBE: NOT DETECTED
SOURCE AMPLIFICATION: NORMAL
SOURCE TP AMPLIFICATION: NORMAL
T VAGINALIS RRNA SPEC QL NAA+PROBE: NOT DETECTED

## 2022-03-28 LAB — CYTOLOGY CVX/VAG DOC THIN PREP: NORMAL

## 2022-05-11 NOTE — ED PROVIDER NOTE - DISCHARGE DATE
16-Oct-2020
Hep B, adolescent or pediatric; 2016 16:50; Jazmyne Chaudhry (RN); [employer] [id number]; IntraMuscular; Vastus Lateralis Left.; 0.5 milliLiter(s); VIS (VIS Published: 02-Feb-2012, VIS Presented: 2016);

## 2022-09-21 ENCOUNTER — RX RENEWAL (OUTPATIENT)
Age: 57
End: 2022-09-21

## 2022-10-03 ENCOUNTER — APPOINTMENT (OUTPATIENT)
Dept: OBGYN | Facility: CLINIC | Age: 57
End: 2022-10-03

## 2022-10-03 VITALS
BODY MASS INDEX: 28.64 KG/M2 | SYSTOLIC BLOOD PRESSURE: 100 MMHG | HEIGHT: 68 IN | DIASTOLIC BLOOD PRESSURE: 60 MMHG | WEIGHT: 189 LBS

## 2022-10-03 PROCEDURE — 99213 OFFICE O/P EST LOW 20 MIN: CPT

## 2022-10-05 NOTE — HISTORY OF PRESENT ILLNESS
[Patient reported PAP Smear was normal] : Patient reported PAP Smear was normal [Mammogramdate] : 10/2021 [BreastSonogramDate] : 10/2021 [PapSmeardate] : 3/21/22 [FreeTextEntry1] : Hysterectomy

## 2023-04-05 ENCOUNTER — RX RENEWAL (OUTPATIENT)
Age: 58
End: 2023-04-05

## 2023-05-15 ENCOUNTER — APPOINTMENT (OUTPATIENT)
Dept: OBGYN | Facility: CLINIC | Age: 58
End: 2023-05-15
Payer: COMMERCIAL

## 2023-05-15 VITALS
WEIGHT: 188 LBS | DIASTOLIC BLOOD PRESSURE: 60 MMHG | SYSTOLIC BLOOD PRESSURE: 102 MMHG | HEIGHT: 68 IN | BODY MASS INDEX: 28.49 KG/M2

## 2023-05-15 PROCEDURE — 99213 OFFICE O/P EST LOW 20 MIN: CPT

## 2023-09-07 ENCOUNTER — APPOINTMENT (OUTPATIENT)
Dept: CARDIOLOGY | Facility: CLINIC | Age: 58
End: 2023-09-07
Payer: COMMERCIAL

## 2023-09-07 ENCOUNTER — NON-APPOINTMENT (OUTPATIENT)
Age: 58
End: 2023-09-07

## 2023-09-07 VITALS
WEIGHT: 167 LBS | BODY MASS INDEX: 25.31 KG/M2 | HEIGHT: 68 IN | SYSTOLIC BLOOD PRESSURE: 108 MMHG | HEART RATE: 87 BPM | OXYGEN SATURATION: 97 % | TEMPERATURE: 98 F | DIASTOLIC BLOOD PRESSURE: 71 MMHG

## 2023-09-07 DIAGNOSIS — R94.31 ABNORMAL ELECTROCARDIOGRAM [ECG] [EKG]: ICD-10-CM

## 2023-09-07 DIAGNOSIS — R55 SYNCOPE AND COLLAPSE: ICD-10-CM

## 2023-09-07 DIAGNOSIS — I49.1 ATRIAL PREMATURE DEPOLARIZATION: ICD-10-CM

## 2023-09-07 PROCEDURE — 93000 ELECTROCARDIOGRAM COMPLETE: CPT | Mod: 59

## 2023-09-07 PROCEDURE — 99214 OFFICE O/P EST MOD 30 MIN: CPT | Mod: 25

## 2023-09-07 PROCEDURE — 93242 EXT ECG>48HR<7D RECORDING: CPT

## 2023-09-08 PROBLEM — I49.1 PAC (PREMATURE ATRIAL CONTRACTION): Status: ACTIVE | Noted: 2023-09-08

## 2023-09-08 PROBLEM — R94.31 ABNORMAL ECG: Status: ACTIVE | Noted: 2020-10-20

## 2023-09-08 NOTE — CARDIOLOGY SUMMARY
[de-identified] : 9/7/23: SR, blocked PAC's with compensatory pause [de-identified] : 10/20/20: pSVT [de-identified] : 10/20/20: nl systolic/diastolic function, trace pericardial effusion

## 2023-09-08 NOTE — HISTORY OF PRESENT ILLNESS
[FreeTextEntry1] : 57F who previously presented for asymptomatic PAT and preoperative eval who is now s/p CONSUELO/salpingectomy 10/23/2020. Postoperatively was found to have tachycardia and PAT and was started on metoprolol succinate 50mg daily. She presents for evaluation of syncope  A holter monitor revealed 4% SVE burden with runs of PAT vs. PJT  She presents for follow-up. she has mild intermittent palpitations. prefers to be off medications as there has been no change in her symptoms.  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

## 2023-09-08 NOTE — REVIEW OF SYSTEMS
[Dyspnea on exertion] : dyspnea during exertion [Palpitations] : palpitations [Syncope] : syncope [Dizziness] : dizziness [Negative] : Heme/Lymph [SOB] : no shortness of breath [Chest Discomfort] : no chest discomfort [Lower Ext Edema] : no extremity edema [Leg Claudication] : no intermittent leg claudication [Orthopnea] : no orthopnea [PND] : no PND

## 2023-09-08 NOTE — DISCUSSION/SUMMARY
[FreeTextEntry1] : Symptoms are not classic of vasovagal nor true orthostasis ?blocked PAC's with pause vs. sinus node dysfunction vs. SVT  will check TTE to evaluate for structural heart disease check TST given URIBE check event monitor

## 2023-10-16 ENCOUNTER — APPOINTMENT (OUTPATIENT)
Dept: CARDIOLOGY | Facility: CLINIC | Age: 58
End: 2023-10-16

## 2023-10-19 ENCOUNTER — APPOINTMENT (OUTPATIENT)
Dept: CARDIOLOGY | Facility: CLINIC | Age: 58
End: 2023-10-19

## 2023-10-27 ENCOUNTER — APPOINTMENT (OUTPATIENT)
Dept: CARDIOLOGY | Facility: CLINIC | Age: 58
End: 2023-10-27

## 2023-11-20 ENCOUNTER — APPOINTMENT (OUTPATIENT)
Dept: OBGYN | Facility: CLINIC | Age: 58
End: 2023-11-20

## 2024-03-05 ENCOUNTER — APPOINTMENT (OUTPATIENT)
Dept: OBGYN | Facility: CLINIC | Age: 59
End: 2024-03-05
Payer: COMMERCIAL

## 2024-03-05 VITALS
BODY MASS INDEX: 24.25 KG/M2 | DIASTOLIC BLOOD PRESSURE: 70 MMHG | WEIGHT: 160 LBS | SYSTOLIC BLOOD PRESSURE: 116 MMHG | HEIGHT: 68 IN

## 2024-03-05 DIAGNOSIS — N95.1 MENOPAUSAL AND FEMALE CLIMACTERIC STATES: ICD-10-CM

## 2024-03-05 DIAGNOSIS — I47.10 SUPRAVENTRICULAR TACHYCARDIA, UNSPECIFIED: ICD-10-CM

## 2024-03-05 DIAGNOSIS — Z01.419 ENCOUNTER FOR GYNECOLOGICAL EXAMINATION (GENERAL) (ROUTINE) W/OUT ABNORMAL FINDINGS: ICD-10-CM

## 2024-03-05 PROCEDURE — 99396 PREV VISIT EST AGE 40-64: CPT

## 2024-03-05 RX ORDER — ESTRADIOL 2 MG/1
2 TABLET ORAL DAILY
Qty: 90 | Refills: 1 | Status: ACTIVE | COMMUNITY
Start: 2020-11-11 | End: 1900-01-01

## 2024-03-05 RX ORDER — ESTRADIOL 1 MG/1
1 TABLET ORAL
Qty: 90 | Refills: 1 | Status: ACTIVE | COMMUNITY
Start: 2022-03-21 | End: 1900-01-01

## 2024-03-05 RX ORDER — METOPROLOL TARTRATE 25 MG/1
25 TABLET, FILM COATED ORAL TWICE DAILY
Qty: 180 | Refills: 2 | Status: DISCONTINUED | COMMUNITY
Start: 2023-09-25 | End: 2024-03-05

## 2024-03-05 NOTE — COUNSELING
[Medication Management] : medication management What Type Of Note Output Would You Prefer (Optional)?: Standard Output How Severe Are Your Spot(S)?: moderate Have Your Spot(S) Been Treated In The Past?: has not been treated Hpi Title: Evaluation of Skin Lesions Year Removed: 1900

## 2024-03-05 NOTE — HISTORY OF PRESENT ILLNESS
[FreeTextEntry1] : Check up.  Feels well. Desires to continue estrogen, has tried to self taper with poor result.  Due for mammogram.

## 2024-03-05 NOTE — PLAN
[FreeTextEntry1] : Mammo/sono q 6 months. Recommend fu cardiology for hx paroxysmal svt Colonoscopy uptodate

## 2024-03-05 NOTE — PHYSICAL EXAM
[Chaperone Present] : A chaperone was present in the examining room during all aspects of the physical examination [Alert] : alert [Appropriately responsive] : appropriately responsive [No Acute Distress] : no acute distress [No Lymphadenopathy] : no lymphadenopathy [Soft] : soft [Non-tender] : non-tender [Non-distended] : non-distended [No HSM] : No HSM [No Lesions] : no lesions [No Mass] : no mass [Oriented x3] : oriented x3 [Examination Of The Breasts] : a normal appearance [No Masses] : no breast masses were palpable [Labia Majora] : normal [Labia Minora] : normal [Normal] : normal [Absent] : absent [Uterine Adnexae] : non-palpable [Declined] : Patient declined rectal exam

## 2024-06-25 NOTE — PROGRESS NOTE ADULT - PROVIDER SPECIALTY LIST ADULT
Electrophysiology Patient never showed up to CT appointment. Called over to imaging facility on [address]. States if patient shows up for scan they can still do it. [facility] called patient left message to reschedule scan and appointment.    Cardiology

## 2024-11-08 ENCOUNTER — APPOINTMENT (OUTPATIENT)
Dept: OBGYN | Facility: CLINIC | Age: 59
End: 2024-11-08
Payer: COMMERCIAL

## 2024-11-08 VITALS
SYSTOLIC BLOOD PRESSURE: 100 MMHG | HEIGHT: 68 IN | WEIGHT: 159 LBS | BODY MASS INDEX: 24.1 KG/M2 | DIASTOLIC BLOOD PRESSURE: 60 MMHG

## 2024-11-08 DIAGNOSIS — N95.1 MENOPAUSAL AND FEMALE CLIMACTERIC STATES: ICD-10-CM

## 2024-11-08 PROCEDURE — 99213 OFFICE O/P EST LOW 20 MIN: CPT

## 2025-04-17 NOTE — DISCHARGE NOTE PROVIDER - NSDCFUSCHEDAPPT_GEN_ALL_CORE_FT
Detail Level: Generalized Size Of Lesion In Cm (Optional): 0 NI LOJA ; 12/07/2020 ; [facility]/ [address]

## 2025-04-28 ENCOUNTER — NON-APPOINTMENT (OUTPATIENT)
Age: 60
End: 2025-04-28

## 2025-04-28 ENCOUNTER — APPOINTMENT (OUTPATIENT)
Dept: OBGYN | Facility: CLINIC | Age: 60
End: 2025-04-28
Payer: COMMERCIAL

## 2025-04-28 VITALS
SYSTOLIC BLOOD PRESSURE: 114 MMHG | DIASTOLIC BLOOD PRESSURE: 68 MMHG | BODY MASS INDEX: 25.76 KG/M2 | WEIGHT: 170 LBS | HEIGHT: 68 IN

## 2025-04-28 DIAGNOSIS — Z01.419 ENCOUNTER FOR GYNECOLOGICAL EXAMINATION (GENERAL) (ROUTINE) W/OUT ABNORMAL FINDINGS: ICD-10-CM

## 2025-04-28 DIAGNOSIS — R92.30 DENSE BREASTS, UNSPECIFIED: ICD-10-CM

## 2025-04-28 DIAGNOSIS — N95.1 MENOPAUSAL AND FEMALE CLIMACTERIC STATES: ICD-10-CM

## 2025-04-28 PROCEDURE — 99396 PREV VISIT EST AGE 40-64: CPT

## 2025-04-28 PROCEDURE — 99459 PELVIC EXAMINATION: CPT

## 2025-04-29 LAB
C TRACH RRNA SPEC QL NAA+PROBE: NOT DETECTED
CANDIDA VAG CYTO: DETECTED
G VAGINALIS+PREV SP MTYP VAG QL MICRO: DETECTED
N GONORRHOEA RRNA SPEC QL NAA+PROBE: NOT DETECTED
SOURCE AMPLIFICATION: NORMAL
SOURCE TP AMPLIFICATION: NORMAL
T VAGINALIS RRNA SPEC QL NAA+PROBE: DETECTED
T VAGINALIS VAG QL WET PREP: DETECTED

## 2025-04-30 ENCOUNTER — NON-APPOINTMENT (OUTPATIENT)
Age: 60
End: 2025-04-30

## 2025-05-02 ENCOUNTER — APPOINTMENT (OUTPATIENT)
Dept: OBGYN | Facility: CLINIC | Age: 60
End: 2025-05-02
Payer: COMMERCIAL

## 2025-05-02 VITALS
SYSTOLIC BLOOD PRESSURE: 96 MMHG | WEIGHT: 175 LBS | HEIGHT: 68 IN | BODY MASS INDEX: 26.52 KG/M2 | DIASTOLIC BLOOD PRESSURE: 60 MMHG

## 2025-05-02 DIAGNOSIS — A59.9 TRICHOMONIASIS, UNSPECIFIED: ICD-10-CM

## 2025-05-02 DIAGNOSIS — B37.31 ACUTE CANDIDIASIS OF VULVA AND VAGINA: ICD-10-CM

## 2025-05-02 DIAGNOSIS — N76.0 ACUTE VAGINITIS: ICD-10-CM

## 2025-05-02 DIAGNOSIS — B96.89 ACUTE VAGINITIS: ICD-10-CM

## 2025-05-02 PROCEDURE — 99213 OFFICE O/P EST LOW 20 MIN: CPT

## 2025-05-02 RX ORDER — TERCONAZOLE 4 MG/G
0.4 CREAM VAGINAL DAILY
Qty: 1 | Refills: 0 | Status: ACTIVE | COMMUNITY
Start: 2025-05-02 | End: 1900-01-01

## 2025-05-02 RX ORDER — METRONIDAZOLE 500 MG/1
500 TABLET ORAL TWICE DAILY
Qty: 14 | Refills: 0 | Status: ACTIVE | COMMUNITY
Start: 2025-05-02 | End: 1900-01-01

## 2025-06-02 ENCOUNTER — APPOINTMENT (OUTPATIENT)
Dept: OBGYN | Facility: CLINIC | Age: 60
End: 2025-06-02
Payer: COMMERCIAL

## 2025-06-02 VITALS
BODY MASS INDEX: 25.61 KG/M2 | WEIGHT: 169 LBS | HEIGHT: 68 IN | SYSTOLIC BLOOD PRESSURE: 102 MMHG | DIASTOLIC BLOOD PRESSURE: 70 MMHG

## 2025-06-02 DIAGNOSIS — A59.9 TRICHOMONIASIS, UNSPECIFIED: ICD-10-CM

## 2025-06-02 PROCEDURE — 99213 OFFICE O/P EST LOW 20 MIN: CPT

## 2025-06-04 LAB
SOURCE AMPLIFICATION: NORMAL
T VAGINALIS RRNA SPEC QL NAA+PROBE: NOT DETECTED